# Patient Record
Sex: FEMALE | Race: WHITE | Employment: OTHER | URBAN - METROPOLITAN AREA
[De-identification: names, ages, dates, MRNs, and addresses within clinical notes are randomized per-mention and may not be internally consistent; named-entity substitution may affect disease eponyms.]

---

## 2024-01-30 ENCOUNTER — APPOINTMENT (OUTPATIENT)
Dept: GENERAL RADIOLOGY | Age: 69
End: 2024-01-30
Payer: MEDICARE

## 2024-01-30 ENCOUNTER — HOSPITAL ENCOUNTER (EMERGENCY)
Age: 69
Discharge: HOME | End: 2024-01-30
Payer: MEDICARE

## 2024-01-30 VITALS
WEIGHT: 190 LBS | DIASTOLIC BLOOD PRESSURE: 66 MMHG | TEMPERATURE: 97.5 F | HEIGHT: 64 IN | RESPIRATION RATE: 18 BRPM | BODY MASS INDEX: 32.44 KG/M2 | OXYGEN SATURATION: 98 % | SYSTOLIC BLOOD PRESSURE: 135 MMHG | HEART RATE: 73 BPM

## 2024-01-30 DIAGNOSIS — M54.41 ACUTE RIGHT-SIDED LOW BACK PAIN WITH RIGHT-SIDED SCIATICA: Primary | ICD-10-CM

## 2024-01-30 PROCEDURE — 6370000000 HC RX 637 (ALT 250 FOR IP)

## 2024-01-30 PROCEDURE — 6360000002 HC RX W HCPCS

## 2024-01-30 PROCEDURE — 96372 THER/PROPH/DIAG INJ SC/IM: CPT

## 2024-01-30 PROCEDURE — 99284 EMERGENCY DEPT VISIT MOD MDM: CPT

## 2024-01-30 PROCEDURE — 72110 X-RAY EXAM L-2 SPINE 4/>VWS: CPT

## 2024-01-30 RX ORDER — LIDOCAINE 50 MG/G
1 PATCH TOPICAL DAILY
Qty: 10 PATCH | Refills: 0 | Status: SHIPPED | OUTPATIENT
Start: 2024-01-30

## 2024-01-30 RX ORDER — IBUPROFEN 800 MG/1
800 TABLET ORAL 2 TIMES DAILY PRN
Qty: 180 TABLET | Refills: 0 | Status: SHIPPED | OUTPATIENT
Start: 2024-01-30

## 2024-01-30 RX ORDER — KETOROLAC TROMETHAMINE 15 MG/ML
15 INJECTION, SOLUTION INTRAMUSCULAR; INTRAVENOUS ONCE
Status: DISCONTINUED | OUTPATIENT
Start: 2024-01-30 | End: 2024-01-30

## 2024-01-30 RX ORDER — KETOROLAC TROMETHAMINE 15 MG/ML
15 INJECTION, SOLUTION INTRAMUSCULAR; INTRAVENOUS ONCE
Status: COMPLETED | OUTPATIENT
Start: 2024-01-30 | End: 2024-01-30

## 2024-01-30 RX ORDER — METHYLPREDNISOLONE 4 MG/1
TABLET ORAL
Qty: 1 KIT | Refills: 0 | Status: SHIPPED | OUTPATIENT
Start: 2024-01-30 | End: 2024-02-05

## 2024-01-30 RX ORDER — ORPHENADRINE CITRATE 30 MG/ML
60 INJECTION INTRAMUSCULAR; INTRAVENOUS ONCE
Status: COMPLETED | OUTPATIENT
Start: 2024-01-30 | End: 2024-01-30

## 2024-01-30 RX ORDER — OXYCODONE AND ACETAMINOPHEN 7.5; 325 MG/1; MG/1
1 TABLET ORAL ONCE
Status: COMPLETED | OUTPATIENT
Start: 2024-01-30 | End: 2024-01-30

## 2024-01-30 RX ORDER — CYCLOBENZAPRINE HCL 10 MG
10 TABLET ORAL 3 TIMES DAILY PRN
Qty: 9 TABLET | Refills: 0 | Status: SHIPPED | OUTPATIENT
Start: 2024-01-30 | End: 2024-02-02

## 2024-01-30 RX ADMIN — ORPHENADRINE CITRATE 60 MG: 60 INJECTION INTRAMUSCULAR; INTRAVENOUS at 12:10

## 2024-01-30 RX ADMIN — OXYCODONE AND ACETAMINOPHEN 1 TABLET: 7.5; 325 TABLET ORAL at 13:26

## 2024-01-30 RX ADMIN — KETOROLAC TROMETHAMINE 15 MG: 15 INJECTION, SOLUTION INTRAMUSCULAR; INTRAVENOUS at 12:15

## 2024-01-30 ASSESSMENT — PAIN DESCRIPTION - LOCATION
LOCATION: BACK
LOCATION: BACK

## 2024-01-30 ASSESSMENT — PAIN - FUNCTIONAL ASSESSMENT: PAIN_FUNCTIONAL_ASSESSMENT: 0-10

## 2024-01-30 ASSESSMENT — PAIN SCALES - GENERAL
PAINLEVEL_OUTOF10: 10
PAINLEVEL_OUTOF10: 7

## 2024-01-30 ASSESSMENT — PAIN DESCRIPTION - ORIENTATION: ORIENTATION: LOWER

## 2024-01-30 ASSESSMENT — PAIN DESCRIPTION - PAIN TYPE: TYPE: ACUTE PAIN

## 2024-01-30 ASSESSMENT — ENCOUNTER SYMPTOMS: BACK PAIN: 1

## 2024-01-30 NOTE — DISCHARGE INSTRUCTIONS
Take medications as directed.     Follow-up with PCP.     Return to ED if any new, or worsening symptoms.

## 2024-01-30 NOTE — ED PROVIDER NOTES
[facility] ED  EMERGENCY DEPARTMENT ENCOUNTER      Pt Name: Sil VARGAS  MRN: [id number]  Birthdate 1955  Date of evaluation: 1/30/2024  Provider: ONELIA Kruse  1:24 PM EST    CHIEF COMPLAINT       Chief Complaint   Patient presents with    Back Pain     X2 days   Woke up in pain   No injury          HISTORY OF PRESENT ILLNESS   (Location/Symptom, Timing/Onset, Context/Setting, Quality, Duration, Modifying Factors, Severity)  Note limiting factors.   Sil VARGAS is a 68 y.o. female whom per chart review has a PMHx of hypertension, depression, GERD, anxiety, s/p laminectomy presents to ED for evaluation of back pain.  Patient reports R sided lower back pain that extends down her R leg.  Patient reports symptoms have been present for the last 2 days.  Patient denies injury, trauma.  States that she has been ambulatory, however does report significantly increased discomfort with ambulation.  Patient reports that she did take 7.5 mg of Percocet prior to coming to the emergency department and reports no improvement in symptoms.  Patient verbalizes no additional complaints.  Patient denies chest pain, shortness of breath, N/V/D, fever, chills, hematuria, dysuria, urinary frequency/urgency, saddle anesthesia, incontinence of bowel or bladder, numbness/tingling/weakness to BUE and BLE.    Patient is reporting that she is out of Percocet, however upon review of [facility] patient did have Percocet prescription filled on 01/02/2024 and was given 120 (30-day supply), 7.5 mg Percocets at that time.    HPI    Nursing Notes were reviewed.    REVIEW OF SYSTEMS    (2-9 systems for level 4, 10 or more for level 5)     Review of Systems   Musculoskeletal:  Positive for back pain.   All other systems reviewed and are negative.      Except as noted above the remainder of the review of systems was reviewed and negative.       PAST MEDICAL HISTORY     Past Medical History:   Diagnosis Date    Anxiety

## 2024-01-30 NOTE — ED TRIAGE NOTES
Pt arrives by private vehicle with report of back pain x2 days   Pt states it woke her out of her sleep   Denies any injury or trauma   Pt took percocet around 5am this morning

## 2024-04-13 ENCOUNTER — APPOINTMENT (OUTPATIENT)
Dept: GENERAL RADIOLOGY | Age: 69
End: 2024-04-13
Payer: MEDICARE

## 2024-04-13 ENCOUNTER — HOSPITAL ENCOUNTER (EMERGENCY)
Age: 69
Discharge: HOME | End: 2024-04-13
Attending: EMERGENCY MEDICINE
Payer: MEDICARE

## 2024-04-13 VITALS
TEMPERATURE: 98.6 F | RESPIRATION RATE: 20 BRPM | WEIGHT: 170 LBS | SYSTOLIC BLOOD PRESSURE: 115 MMHG | HEIGHT: 64 IN | HEART RATE: 76 BPM | BODY MASS INDEX: 29.02 KG/M2 | DIASTOLIC BLOOD PRESSURE: 74 MMHG | OXYGEN SATURATION: 97 %

## 2024-04-13 DIAGNOSIS — E86.0 DEHYDRATION: ICD-10-CM

## 2024-04-13 DIAGNOSIS — R07.9 CHEST PAIN, UNSPECIFIED TYPE: Primary | ICD-10-CM

## 2024-04-13 LAB
ALBUMIN SERPL-MCNC: 4.6 G/DL (ref 3.5–4.6)
ALP SERPL-CCNC: 108 U/L (ref 40–130)
ALT SERPL-CCNC: 17 U/L (ref 0–33)
ANION GAP SERPL CALCULATED.3IONS-SCNC: 13 MEQ/L (ref 9–15)
AST SERPL-CCNC: 17 U/L (ref 0–35)
BASOPHILS # BLD: 0 K/UL (ref 0–0.2)
BASOPHILS NFR BLD: 0.5 %
BILIRUB SERPL-MCNC: 0.6 MG/DL (ref 0.2–0.7)
BUN SERPL-MCNC: 12 MG/DL (ref 8–23)
CALCIUM SERPL-MCNC: 9.5 MG/DL (ref 8.5–9.9)
CHLORIDE SERPL-SCNC: 101 MEQ/L (ref 95–107)
CO2 SERPL-SCNC: 24 MEQ/L (ref 20–31)
CREAT SERPL-MCNC: 1.18 MG/DL (ref 0.5–0.9)
D DIMER PPP FEU-MCNC: 0.39 MG/L FEU (ref 0–0.5)
EOSINOPHIL # BLD: 0 K/UL (ref 0–0.7)
EOSINOPHIL NFR BLD: 0.3 %
ERYTHROCYTE [DISTWIDTH] IN BLOOD BY AUTOMATED COUNT: 12.6 % (ref 11.5–14.5)
GLOBULIN SER CALC-MCNC: 3.6 G/DL (ref 2.3–3.5)
GLUCOSE SERPL-MCNC: 129 MG/DL (ref 70–99)
HCT VFR BLD AUTO: 43.6 % (ref 37–47)
HGB BLD-MCNC: 14.8 G/DL (ref 12–16)
LYMPHOCYTES # BLD: 1.4 K/UL (ref 1–4.8)
LYMPHOCYTES NFR BLD: 15.5 %
MCH RBC QN AUTO: 32 PG (ref 27–31.3)
MCHC RBC AUTO-ENTMCNC: 33.9 % (ref 33–37)
MCV RBC AUTO: 94.4 FL (ref 79.4–94.8)
MONOCYTES # BLD: 0.4 K/UL (ref 0.2–0.8)
MONOCYTES NFR BLD: 4.6 %
NEUTROPHILS # BLD: 6.9 K/UL (ref 1.4–6.5)
NEUTS SEG NFR BLD: 78.8 %
PLATELET # BLD AUTO: 293 K/UL (ref 130–400)
POTASSIUM SERPL-SCNC: 3.2 MEQ/L (ref 3.4–4.9)
PROT SERPL-MCNC: 8.2 G/DL (ref 6.3–8)
RBC # BLD AUTO: 4.62 M/UL (ref 4.2–5.4)
SODIUM SERPL-SCNC: 138 MEQ/L (ref 135–144)
TROPONIN, HIGH SENSITIVITY: 14 NG/L (ref 0–19)
TROPONIN, HIGH SENSITIVITY: 15 NG/L (ref 0–19)
WBC # BLD AUTO: 8.8 K/UL (ref 4.8–10.8)

## 2024-04-13 PROCEDURE — 96376 TX/PRO/DX INJ SAME DRUG ADON: CPT

## 2024-04-13 PROCEDURE — 36415 COLL VENOUS BLD VENIPUNCTURE: CPT

## 2024-04-13 PROCEDURE — 80053 COMPREHEN METABOLIC PANEL: CPT

## 2024-04-13 PROCEDURE — 93005 ELECTROCARDIOGRAM TRACING: CPT | Performed by: EMERGENCY MEDICINE

## 2024-04-13 PROCEDURE — 96361 HYDRATE IV INFUSION ADD-ON: CPT

## 2024-04-13 PROCEDURE — 2580000003 HC RX 258: Performed by: EMERGENCY MEDICINE

## 2024-04-13 PROCEDURE — 99285 EMERGENCY DEPT VISIT HI MDM: CPT

## 2024-04-13 PROCEDURE — 85379 FIBRIN DEGRADATION QUANT: CPT

## 2024-04-13 PROCEDURE — 6370000000 HC RX 637 (ALT 250 FOR IP): Performed by: EMERGENCY MEDICINE

## 2024-04-13 PROCEDURE — 84484 ASSAY OF TROPONIN QUANT: CPT

## 2024-04-13 PROCEDURE — 71045 X-RAY EXAM CHEST 1 VIEW: CPT

## 2024-04-13 PROCEDURE — 6360000002 HC RX W HCPCS: Performed by: EMERGENCY MEDICINE

## 2024-04-13 PROCEDURE — 96374 THER/PROPH/DIAG INJ IV PUSH: CPT

## 2024-04-13 PROCEDURE — 85025 COMPLETE CBC W/AUTO DIFF WBC: CPT

## 2024-04-13 RX ORDER — ACETAMINOPHEN 500 MG
1000 TABLET ORAL ONCE
Status: COMPLETED | OUTPATIENT
Start: 2024-04-13 | End: 2024-04-13

## 2024-04-13 RX ORDER — 0.9 % SODIUM CHLORIDE 0.9 %
1000 INTRAVENOUS SOLUTION INTRAVENOUS ONCE
Status: COMPLETED | OUTPATIENT
Start: 2024-04-13 | End: 2024-04-13

## 2024-04-13 RX ORDER — LORAZEPAM 2 MG/ML
1 INJECTION INTRAMUSCULAR ONCE
Status: COMPLETED | OUTPATIENT
Start: 2024-04-13 | End: 2024-04-13

## 2024-04-13 RX ORDER — TRAZODONE HYDROCHLORIDE 50 MG/1
50 TABLET ORAL NIGHTLY
Qty: 30 TABLET | Refills: 1 | Status: SHIPPED | OUTPATIENT
Start: 2024-04-13 | End: 2024-04-13

## 2024-04-13 RX ORDER — TRAZODONE HYDROCHLORIDE 50 MG/1
50 TABLET ORAL NIGHTLY
Qty: 30 TABLET | Refills: 1 | Status: SHIPPED | OUTPATIENT
Start: 2024-04-13

## 2024-04-13 RX ORDER — OXYCODONE HYDROCHLORIDE AND ACETAMINOPHEN 5; 325 MG/1; MG/1
1 TABLET ORAL ONCE
Status: COMPLETED | OUTPATIENT
Start: 2024-04-13 | End: 2024-04-13

## 2024-04-13 RX ORDER — TRAZODONE HYDROCHLORIDE 50 MG/1
50 TABLET ORAL ONCE
Status: COMPLETED | OUTPATIENT
Start: 2024-04-13 | End: 2024-04-13

## 2024-04-13 RX ORDER — ASPIRIN 81 MG/1
162 TABLET, CHEWABLE ORAL ONCE
Status: COMPLETED | OUTPATIENT
Start: 2024-04-13 | End: 2024-04-13

## 2024-04-13 RX ORDER — NITROGLYCERIN 0.4 MG/1
0.4 TABLET SUBLINGUAL EVERY 5 MIN PRN
Status: COMPLETED | OUTPATIENT
Start: 2024-04-13 | End: 2024-04-13

## 2024-04-13 RX ADMIN — TRAZODONE HYDROCHLORIDE 50 MG: 50 TABLET ORAL at 19:04

## 2024-04-13 RX ADMIN — NITROGLYCERIN 0.4 MG: 0.4 TABLET SUBLINGUAL at 17:02

## 2024-04-13 RX ADMIN — ASPIRIN 81 MG 162 MG: 81 TABLET ORAL at 16:38

## 2024-04-13 RX ADMIN — LORAZEPAM 1 MG: 2 INJECTION INTRAMUSCULAR; INTRAVENOUS at 18:38

## 2024-04-13 RX ADMIN — NITROGLYCERIN 0.4 MG: 0.4 TABLET SUBLINGUAL at 17:07

## 2024-04-13 RX ADMIN — LORAZEPAM 1 MG: 2 INJECTION INTRAMUSCULAR; INTRAVENOUS at 16:49

## 2024-04-13 RX ADMIN — ACETAMINOPHEN 1000 MG: 500 TABLET ORAL at 17:58

## 2024-04-13 RX ADMIN — SODIUM CHLORIDE 1000 ML: 9 INJECTION, SOLUTION INTRAVENOUS at 16:47

## 2024-04-13 RX ADMIN — OXYCODONE HYDROCHLORIDE AND ACETAMINOPHEN 1 TABLET: 5; 325 TABLET ORAL at 18:37

## 2024-04-13 RX ADMIN — NITROGLYCERIN 0.4 MG: 0.4 TABLET SUBLINGUAL at 16:57

## 2024-04-13 ASSESSMENT — PAIN SCALES - GENERAL
PAINLEVEL_OUTOF10: 2
PAINLEVEL_OUTOF10: 5
PAINLEVEL_OUTOF10: 5

## 2024-04-13 ASSESSMENT — LIFESTYLE VARIABLES
HOW OFTEN DO YOU HAVE A DRINK CONTAINING ALCOHOL: NEVER
HOW MANY STANDARD DRINKS CONTAINING ALCOHOL DO YOU HAVE ON A TYPICAL DAY: PATIENT DOES NOT DRINK

## 2024-04-13 ASSESSMENT — ENCOUNTER SYMPTOMS
COUGH: 0
CHEST TIGHTNESS: 0
SORE THROAT: 0
EYE DISCHARGE: 0
DIARRHEA: 1
PHOTOPHOBIA: 0
ABDOMINAL PAIN: 0
VOMITING: 0
ABDOMINAL DISTENTION: 0
WHEEZING: 0
NAUSEA: 1
SHORTNESS OF BREATH: 0

## 2024-04-13 ASSESSMENT — PAIN DESCRIPTION - LOCATION
LOCATION: HEAD
LOCATION: HEAD

## 2024-04-13 ASSESSMENT — PAIN - FUNCTIONAL ASSESSMENT: PAIN_FUNCTIONAL_ASSESSMENT: NONE - DENIES PAIN

## 2024-04-13 NOTE — ED PROVIDER NOTES
[facility] ED  eMERGENCY dEPARTMENT eNCOUnter      Pt Name: Sil VARGAS  MRN: [id number]  Birthdate 1955  Date of evaluation: 4/13/2024  Provider: Fortino Ellsworth MD    CHIEF COMPLAINT       Chief Complaint   Patient presents with   • Chest Pain     Pt arrived via squad from home, c/o chest pain and sob, states recently began antibiotic for gum infection.          HISTORY OF PRESENT ILLNESS   (Location/Symptom, Timing/Onset,Context/Setting, Quality, Duration, Modifying Factors, Severity)  Note limiting factors.   Sil VARGAS is a 69 y.o. female who presents to the emergency department for evaluation of chest pain.  Patient has history of hypertension and anxiety and asthma.  She reports occasion to [place] last week and while she was there picked up some type of viral bug causing severe diarrhea and nausea and she really did not eat in the past week.  She flew home and this morning developed chest pressure with no real shortness of breath.  She feels a little bit sweaty at times.  No radiation of the pressure.  No associated fever or chills.  She has some persistent nausea noted along with diarrhea again this morning.  She feels like she may be dehydrated and also states that she feels anxious.  No prior cardiac history besides hypertension.  She had a stress test in the past couple years that apparently was negative.  Non-smoker and no strong family history.    HPI    NursingNotes were reviewed.    REVIEW OF SYSTEMS    (2-9 systems for level 4, 10 or more for level 5)     Review of Systems   Constitutional:  Negative for chills and diaphoresis.   HENT:  Negative for congestion, ear pain, mouth sores and sore throat.    Eyes:  Negative for photophobia and discharge.   Respiratory:  Negative for cough, chest tightness, shortness of breath and wheezing.    Cardiovascular:  Positive for chest pain. Negative for palpitations.   Gastrointestinal:  Positive for diarrhea and nausea. Negative    TempSrc:       SpO2: 98% 98% 98% 97%   Weight:       Height:              MDM    Patient feeling much better on recheck.  She has had problems with anxiety and insomnia I believe this is driving her overall symptoms.  Today she was dehydrated from the diarrhea episodes for the past week.  Her  is now at bedside and states that she was extremely stressed because the airport was stressful, having diarrhea on the plane and which she went through this past week is really stressed her out and he thinks that is why she has the chest pain symptoms.  Her EKG is normal.  Her serial enzymes are negative.  She had no change in symptoms from the nitroglycerin but rather the Ativan seemed to help for the most.  She has had a stress test in the past years it has been negative.  At this point with her heart score of 3, I think she can be discharged home improved to follow-up with her PCP.  Return here for worsening    CONSULTS:  None    PROCEDURES:  Unless otherwise noted below, none     Procedures    FINAL IMPRESSION      1. Chest pain, unspecified type    2. Dehydration          DISPOSITION/PLAN   DISPOSITION Decision To Discharge 04/13/2024 06:52:44 PM      PATIENT REFERRED TO:  Brien Dumas MD  [id number] [address]  [phone]    In 3 days        DISCHARGE MEDICATIONS:  New Prescriptions    No medications on file          (Please note that portions of this note were completed with a voice recognition program.  Efforts were made to edit the dictations but occasionally words are mis-transcribed.)    Fortino Ellsworth MD (electronically signed)  Attending Emergency Physician         Fortino Ellsworth MD  04/13/24 [id number]       Fortino Ellsworth MD  04/13/24 [id number]

## 2024-04-13 NOTE — ED NOTES
Pt c/o restless leg syndrome, states takes percocet at home for it, also c/o increase anxiety. Dr. Ellsworth made aware.

## 2024-04-15 LAB
EKG ATRIAL RATE: 82 BPM
EKG P AXIS: 44 DEGREES
EKG P-R INTERVAL: 138 MS
EKG Q-T INTERVAL: 402 MS
EKG QRS DURATION: 86 MS
EKG QTC CALCULATION (BAZETT): 469 MS
EKG R AXIS: 5 DEGREES
EKG T AXIS: 23 DEGREES
EKG VENTRICULAR RATE: 82 BPM

## 2024-04-15 PROCEDURE — 93010 ELECTROCARDIOGRAM REPORT: CPT | Performed by: INTERNAL MEDICINE

## 2024-04-26 ENCOUNTER — HOSPITAL ENCOUNTER (EMERGENCY)
Age: 69
Discharge: HOME | End: 2024-04-26
Payer: MEDICARE

## 2024-04-26 VITALS
HEART RATE: 88 BPM | TEMPERATURE: 98.6 F | SYSTOLIC BLOOD PRESSURE: 154 MMHG | BODY MASS INDEX: 30.73 KG/M2 | DIASTOLIC BLOOD PRESSURE: 89 MMHG | HEIGHT: 64 IN | WEIGHT: 180 LBS | OXYGEN SATURATION: 96 % | RESPIRATION RATE: 15 BRPM

## 2024-04-26 DIAGNOSIS — F41.1 ANXIETY STATE: Primary | ICD-10-CM

## 2024-04-26 LAB
ALBUMIN SERPL-MCNC: 3.7 G/DL (ref 3.5–4.6)
ALP SERPL-CCNC: 88 U/L (ref 40–130)
ALT SERPL-CCNC: 8 U/L (ref 0–33)
AMPHET UR QL SCN: ABNORMAL
ANION GAP SERPL CALCULATED.3IONS-SCNC: 10 MEQ/L (ref 9–15)
APAP SERPL-MCNC: <5 UG/ML (ref 10–30)
AST SERPL-CCNC: 14 U/L (ref 0–35)
BACTERIA URNS QL MICRO: NEGATIVE /HPF
BARBITURATES UR QL SCN: ABNORMAL
BASOPHILS # BLD: 0 K/UL (ref 0–0.2)
BASOPHILS NFR BLD: 0.4 %
BENZODIAZ UR QL SCN: POSITIVE
BILIRUB SERPL-MCNC: 0.3 MG/DL (ref 0.2–0.7)
BILIRUB UR QL STRIP: NEGATIVE
BUN SERPL-MCNC: 14 MG/DL (ref 8–23)
CALCIUM SERPL-MCNC: 8.8 MG/DL (ref 8.5–9.9)
CANNABINOIDS UR QL SCN: ABNORMAL
CHLORIDE SERPL-SCNC: 105 MEQ/L (ref 95–107)
CHOLEST SERPL-MCNC: 162 MG/DL (ref 0–199)
CK SERPL-CCNC: 57 U/L (ref 0–170)
CLARITY UR: CLEAR
CO2 SERPL-SCNC: 27 MEQ/L (ref 20–31)
COCAINE UR QL SCN: ABNORMAL
COLOR UR: YELLOW
CREAT SERPL-MCNC: 1.24 MG/DL (ref 0.5–0.9)
DRUG SCREEN COMMENT UR-IMP: ABNORMAL
EOSINOPHIL # BLD: 0.1 K/UL (ref 0–0.7)
EOSINOPHIL NFR BLD: 1.9 %
EPI CELLS #/AREA URNS AUTO: ABNORMAL /HPF (ref 0–5)
ERYTHROCYTE [DISTWIDTH] IN BLOOD BY AUTOMATED COUNT: 12.4 % (ref 11.5–14.5)
ESTIMATED AVERAGE GLUCOSE: 103 MG/DL
ETHANOL PERCENT: NORMAL G/DL
ETHANOLAMINE SERPL-MCNC: <10 MG/DL (ref 0–0.08)
FENTANYL SCREEN, URINE: ABNORMAL
GLOBULIN SER CALC-MCNC: 2.8 G/DL (ref 2.3–3.5)
GLUCOSE SERPL-MCNC: 123 MG/DL (ref 70–99)
GLUCOSE UR STRIP-MCNC: NEGATIVE MG/DL
HBA1C MFR BLD: 5.2 % (ref 4–6)
HCT VFR BLD AUTO: 35.1 % (ref 37–47)
HDLC SERPL-MCNC: 45 MG/DL (ref 40–59)
HGB BLD-MCNC: 11.9 G/DL (ref 12–16)
HGB UR QL STRIP: ABNORMAL
HYALINE CASTS #/AREA URNS AUTO: ABNORMAL /HPF (ref 0–5)
KETONES UR STRIP-MCNC: NEGATIVE MG/DL
LDLC SERPL CALC-MCNC: 93 MG/DL (ref 0–129)
LEUKOCYTE ESTERASE UR QL STRIP: ABNORMAL
LYMPHOCYTES # BLD: 1.5 K/UL (ref 1–4.8)
LYMPHOCYTES NFR BLD: 26.8 %
MCH RBC QN AUTO: 31.6 PG (ref 27–31.3)
MCHC RBC AUTO-ENTMCNC: 33.9 % (ref 33–37)
MCV RBC AUTO: 93.1 FL (ref 79.4–94.8)
METHADONE UR QL SCN: ABNORMAL
MONOCYTES # BLD: 0.4 K/UL (ref 0.2–0.8)
MONOCYTES NFR BLD: 6.3 %
NEUTROPHILS # BLD: 3.7 K/UL (ref 1.4–6.5)
NEUTS SEG NFR BLD: 64.2 %
NITRITE UR QL STRIP: NEGATIVE
OPIATES UR QL SCN: ABNORMAL
OXYCODONE UR QL SCN: POSITIVE
PCP UR QL SCN: ABNORMAL
PH UR STRIP: 5.5 [PH] (ref 5–9)
PLATELET # BLD AUTO: 185 K/UL (ref 130–400)
POTASSIUM SERPL-SCNC: 3.8 MEQ/L (ref 3.4–4.9)
PROPOXYPH UR QL SCN: ABNORMAL
PROT SERPL-MCNC: 6.5 G/DL (ref 6.3–8)
PROT UR STRIP-MCNC: NEGATIVE MG/DL
RBC # BLD AUTO: 3.77 M/UL (ref 4.2–5.4)
RBC #/AREA URNS AUTO: ABNORMAL /HPF (ref 0–5)
SALICYLATES SERPL-MCNC: <0.3 MG/DL (ref 15–30)
SARS-COV-2 RDRP RESP QL NAA+PROBE: NOT DETECTED
SODIUM SERPL-SCNC: 142 MEQ/L (ref 135–144)
SP GR UR STRIP: 1.01 (ref 1–1.03)
TRIGL SERPL-MCNC: 121 MG/DL (ref 0–150)
TSH SERPL-MCNC: 2.1 UIU/ML (ref 0.44–3.86)
URINE REFLEX TO CULTURE: ABNORMAL
UROBILINOGEN UR STRIP-ACNC: 0.2 E.U./DL
WBC # BLD AUTO: 5.7 K/UL (ref 4.8–10.8)
WBC #/AREA URNS AUTO: ABNORMAL /HPF (ref 0–5)

## 2024-04-26 PROCEDURE — 99283 EMERGENCY DEPT VISIT LOW MDM: CPT

## 2024-04-26 PROCEDURE — 80053 COMPREHEN METABOLIC PANEL: CPT

## 2024-04-26 PROCEDURE — 80307 DRUG TEST PRSMV CHEM ANLYZR: CPT

## 2024-04-26 PROCEDURE — 80179 DRUG ASSAY SALICYLATE: CPT

## 2024-04-26 PROCEDURE — 83036 HEMOGLOBIN GLYCOSYLATED A1C: CPT

## 2024-04-26 PROCEDURE — 87635 SARS-COV-2 COVID-19 AMP PRB: CPT

## 2024-04-26 PROCEDURE — 84443 ASSAY THYROID STIM HORMONE: CPT

## 2024-04-26 PROCEDURE — 85025 COMPLETE CBC W/AUTO DIFF WBC: CPT

## 2024-04-26 PROCEDURE — 80143 DRUG ASSAY ACETAMINOPHEN: CPT

## 2024-04-26 PROCEDURE — 6370000000 HC RX 637 (ALT 250 FOR IP): Performed by: EMERGENCY MEDICINE

## 2024-04-26 PROCEDURE — 80061 LIPID PANEL: CPT

## 2024-04-26 PROCEDURE — 82550 ASSAY OF CK (CPK): CPT

## 2024-04-26 PROCEDURE — 36415 COLL VENOUS BLD VENIPUNCTURE: CPT

## 2024-04-26 PROCEDURE — 82077 ASSAY SPEC XCP UR&BREATH IA: CPT

## 2024-04-26 PROCEDURE — 81001 URINALYSIS AUTO W/SCOPE: CPT

## 2024-04-26 RX ORDER — ALPRAZOLAM 0.5 MG/1
0.5 TABLET ORAL ONCE
Status: COMPLETED | OUTPATIENT
Start: 2024-04-26 | End: 2024-04-26

## 2024-04-26 RX ADMIN — ALPRAZOLAM 0.5 MG: 0.5 TABLET ORAL at 02:11

## 2024-04-26 ASSESSMENT — LIFESTYLE VARIABLES
HOW MANY STANDARD DRINKS CONTAINING ALCOHOL DO YOU HAVE ON A TYPICAL DAY: PATIENT DOES NOT DRINK
HOW OFTEN DO YOU HAVE A DRINK CONTAINING ALCOHOL: NEVER

## 2024-04-26 ASSESSMENT — ENCOUNTER SYMPTOMS
ABDOMINAL PAIN: 0
APNEA: 0
ALLERGIC/IMMUNOLOGIC NEGATIVE: 1
COLOR CHANGE: 0
EYE PAIN: 0
SHORTNESS OF BREATH: 0
TROUBLE SWALLOWING: 0

## 2024-04-26 ASSESSMENT — PAIN - FUNCTIONAL ASSESSMENT: PAIN_FUNCTIONAL_ASSESSMENT: NONE - DENIES PAIN

## 2024-04-26 NOTE — ED PROVIDER NOTES
[facility] ED  eMERGENCYdEPARTMENT eNCOUnter        Pt Name: Sil VARGAS  MRN: [id number]  Birthdate 1955of evaluation: 4/26/2024  Provider:Fortino Ellsworth MD  12:50 AM EDT    CHIEF COMPLAINT       Chief Complaint   Patient presents with    Psychiatric Evaluation         HISTORY OF PRESENT ILLNESS  (Location/Symptom, Timing/Onset, Context/Setting, Quality, Duration, Modifying Factors, Severity.)   Sil VARGAS is a 69 y.o. female who presents to the emergency department with complaints of depression, anxiety, sleep disturbances and \"OCD\".  Patient states \"not really\" when I asked if she had any suicidal ideation.  She denies any homicidal ideation.    HPI    Nursing Notes were reviewed and I agree.    REVIEW OF SYSTEMS    (2-9 systems for level 4, 10 or more for level 5)     Review of Systems   Constitutional:  Negative for diaphoresis and fever.   HENT:  Negative for hearing loss and trouble swallowing.    Eyes:  Negative for pain.   Respiratory:  Negative for apnea and shortness of breath.    Cardiovascular:  Negative for chest pain.   Gastrointestinal:  Negative for abdominal pain.   Endocrine: Negative.    Genitourinary:  Negative for hematuria.   Musculoskeletal:  Negative for neck pain and neck stiffness.   Skin:  Negative for color change.   Allergic/Immunologic: Negative.    Neurological:  Negative for dizziness and numbness.   Hematological: Negative.    Psychiatric/Behavioral:  Positive for sleep disturbance. The patient is nervous/anxious.    All other systems reviewed and are negative.       as noted above the remainder of the review of systems was reviewed and negative.       PAST MEDICAL HISTORY     Past Medical History:   Diagnosis Date    Anxiety     Carcinoid (except of appendix)     Depression     GERD (gastroesophageal reflux disease)     Hypertension          SURGICAL HISTORY       Past Surgical History:   Procedure Laterality Date    BREAST SURGERY

## 2024-04-26 NOTE — ED NOTES
Pt c/o cold symptoms and is requesting covid testing due to recent travel.  Swab obtained and sent to lab.

## 2024-04-26 NOTE — ED TRIAGE NOTES
Since the beginning of the month having problems dealing with her depression, OCD and anxiety. Hasn't been able to sleep in 2 night d/t racing thoughts denies SI/HI.

## 2024-04-26 NOTE — ED NOTES
Home instructions reviewed with patient and . Patient to follow up with PCP later today.  Patient and  verbalize an understanding for discharge and follow up instructions.  Patient ambulated to exit without difficulty

## 2024-04-26 NOTE — ED NOTES
Patient up to nurses station asking how much longer as she is feeling calmer and would like to go home.  Patient will call her PCP in the AM

## 2024-04-26 NOTE — ED NOTES
Patient changed into psych safe clothing.   Urine  obtained and sent to lab.   Skin and contraband check performed.   Contraband check negative at this time.   Lab called to draw blood.

## 2024-04-26 NOTE — ED NOTES
Spoke with patient and . They both state that she just needs something for her anxiety and they would like to be discharged home.  The patient states that she followed up with her PCP after her last ED visit on 4/13/2024, the PCP started her on Xanax 0.25 mg. The patient states that this has not helped as much as the ativan that she receive while in the ED.  She reports that she is still having difficulty sleeping with the 50 mg of trazodone, she is going to contact her PCP for an increased dose. The  remains at the bedside. The patient denies HI,SI, and A/V hallucinations.

## 2024-05-07 ENCOUNTER — HOSPITAL ENCOUNTER (INPATIENT)
Age: 69
LOS: 3 days | Discharge: HOME | End: 2024-05-10
Attending: EMERGENCY MEDICINE | Admitting: PSYCHIATRY & NEUROLOGY
Payer: MEDICARE

## 2024-05-07 DIAGNOSIS — G47.00 INSOMNIA, UNSPECIFIED TYPE: ICD-10-CM

## 2024-05-07 DIAGNOSIS — F41.1 ANXIETY STATE: Primary | ICD-10-CM

## 2024-05-07 DIAGNOSIS — F32.A DEPRESSION, UNSPECIFIED DEPRESSION TYPE: ICD-10-CM

## 2024-05-07 LAB
ALBUMIN SERPL-MCNC: 4 G/DL (ref 3.5–4.6)
ALP SERPL-CCNC: 98 U/L (ref 40–130)
ALT SERPL-CCNC: 10 U/L (ref 0–33)
AMPHET UR QL SCN: POSITIVE
ANION GAP SERPL CALCULATED.3IONS-SCNC: 11 MEQ/L (ref 9–15)
APAP SERPL-MCNC: <5 UG/ML (ref 10–30)
AST SERPL-CCNC: 19 U/L (ref 0–35)
BACTERIA URNS QL MICRO: NEGATIVE /HPF
BARBITURATES UR QL SCN: ABNORMAL
BASOPHILS # BLD: 0 K/UL (ref 0–0.2)
BASOPHILS NFR BLD: 0.2 %
BENZODIAZ UR QL SCN: POSITIVE
BILIRUB SERPL-MCNC: 0.4 MG/DL (ref 0.2–0.7)
BILIRUB UR QL STRIP: NEGATIVE
BUN SERPL-MCNC: 9 MG/DL (ref 8–23)
CALCIUM SERPL-MCNC: 8.6 MG/DL (ref 8.5–9.9)
CANNABINOIDS UR QL SCN: ABNORMAL
CHLORIDE SERPL-SCNC: 103 MEQ/L (ref 95–107)
CHOLEST SERPL-MCNC: 146 MG/DL (ref 0–199)
CK SERPL-CCNC: 84 U/L (ref 0–170)
CLARITY UR: CLEAR
CO2 SERPL-SCNC: 26 MEQ/L (ref 20–31)
COCAINE UR QL SCN: ABNORMAL
COLOR UR: YELLOW
CREAT SERPL-MCNC: 1.41 MG/DL (ref 0.5–0.9)
DRUG SCREEN COMMENT UR-IMP: ABNORMAL
EOSINOPHIL # BLD: 0 K/UL (ref 0–0.7)
EOSINOPHIL NFR BLD: 0.9 %
EPI CELLS #/AREA URNS AUTO: ABNORMAL /HPF (ref 0–5)
ERYTHROCYTE [DISTWIDTH] IN BLOOD BY AUTOMATED COUNT: 13.2 % (ref 11.5–14.5)
ESTIMATED AVERAGE GLUCOSE: 103 MG/DL
ETHANOL PERCENT: NORMAL G/DL
ETHANOLAMINE SERPL-MCNC: <10 MG/DL (ref 0–0.08)
FENTANYL SCREEN, URINE: ABNORMAL
GLOBULIN SER CALC-MCNC: 3.1 G/DL (ref 2.3–3.5)
GLUCOSE SERPL-MCNC: 138 MG/DL (ref 70–99)
GLUCOSE UR STRIP-MCNC: NEGATIVE MG/DL
HBA1C MFR BLD: 5.2 % (ref 4–6)
HCT VFR BLD AUTO: 35.3 % (ref 37–47)
HDLC SERPL-MCNC: 44 MG/DL (ref 40–59)
HGB BLD-MCNC: 11.8 G/DL (ref 12–16)
HGB UR QL STRIP: ABNORMAL
HYALINE CASTS #/AREA URNS AUTO: ABNORMAL /HPF (ref 0–5)
KETONES UR STRIP-MCNC: ABNORMAL MG/DL
LDLC SERPL CALC-MCNC: 74 MG/DL (ref 0–129)
LEUKOCYTE ESTERASE UR QL STRIP: NEGATIVE
LYMPHOCYTES # BLD: 0.6 K/UL (ref 1–4.8)
LYMPHOCYTES NFR BLD: 12.4 %
MCH RBC QN AUTO: 32 PG (ref 27–31.3)
MCHC RBC AUTO-ENTMCNC: 33.4 % (ref 33–37)
MCV RBC AUTO: 95.7 FL (ref 79.4–94.8)
METHADONE UR QL SCN: ABNORMAL
MONOCYTES # BLD: 0.3 K/UL (ref 0.2–0.8)
MONOCYTES NFR BLD: 5.9 %
NEUTROPHILS # BLD: 3.6 K/UL (ref 1.4–6.5)
NEUTS SEG NFR BLD: 80.4 %
NITRITE UR QL STRIP: NEGATIVE
OPIATES UR QL SCN: ABNORMAL
OXYCODONE UR QL SCN: POSITIVE
PCP UR QL SCN: ABNORMAL
PH UR STRIP: 6 [PH] (ref 5–9)
PLATELET # BLD AUTO: 225 K/UL (ref 130–400)
POTASSIUM SERPL-SCNC: 3.9 MEQ/L (ref 3.4–4.9)
PROPOXYPH UR QL SCN: ABNORMAL
PROT SERPL-MCNC: 7.1 G/DL (ref 6.3–8)
PROT UR STRIP-MCNC: 30 MG/DL
RBC # BLD AUTO: 3.69 M/UL (ref 4.2–5.4)
RBC #/AREA URNS AUTO: ABNORMAL /HPF (ref 0–5)
SALICYLATES SERPL-MCNC: <0.3 MG/DL (ref 15–30)
SODIUM SERPL-SCNC: 140 MEQ/L (ref 135–144)
SP GR UR STRIP: 1.02 (ref 1–1.03)
TRIGL SERPL-MCNC: 139 MG/DL (ref 0–150)
TSH SERPL-MCNC: 1.05 UIU/ML (ref 0.44–3.86)
URINE REFLEX TO CULTURE: ABNORMAL
UROBILINOGEN UR STRIP-ACNC: 0.2 E.U./DL
WBC # BLD AUTO: 4.4 K/UL (ref 4.8–10.8)
WBC #/AREA URNS AUTO: ABNORMAL /HPF (ref 0–5)

## 2024-05-07 PROCEDURE — 85025 COMPLETE CBC W/AUTO DIFF WBC: CPT

## 2024-05-07 PROCEDURE — 80179 DRUG ASSAY SALICYLATE: CPT

## 2024-05-07 PROCEDURE — 84443 ASSAY THYROID STIM HORMONE: CPT

## 2024-05-07 PROCEDURE — 81001 URINALYSIS AUTO W/SCOPE: CPT

## 2024-05-07 PROCEDURE — 80061 LIPID PANEL: CPT

## 2024-05-07 PROCEDURE — 99285 EMERGENCY DEPT VISIT HI MDM: CPT

## 2024-05-07 PROCEDURE — 1240000000 HC EMOTIONAL WELLNESS R&B

## 2024-05-07 PROCEDURE — 36415 COLL VENOUS BLD VENIPUNCTURE: CPT

## 2024-05-07 PROCEDURE — 83036 HEMOGLOBIN GLYCOSYLATED A1C: CPT

## 2024-05-07 PROCEDURE — 82077 ASSAY SPEC XCP UR&BREATH IA: CPT

## 2024-05-07 PROCEDURE — 80143 DRUG ASSAY ACETAMINOPHEN: CPT

## 2024-05-07 PROCEDURE — 82550 ASSAY OF CK (CPK): CPT

## 2024-05-07 PROCEDURE — 80053 COMPREHEN METABOLIC PANEL: CPT

## 2024-05-07 PROCEDURE — 80307 DRUG TEST PRSMV CHEM ANLYZR: CPT

## 2024-05-07 PROCEDURE — 6370000000 HC RX 637 (ALT 250 FOR IP): Performed by: PSYCHIATRY & NEUROLOGY

## 2024-05-07 RX ORDER — HALOPERIDOL 5 MG/ML
5 INJECTION INTRAMUSCULAR EVERY 6 HOURS PRN
Status: DISCONTINUED | OUTPATIENT
Start: 2024-05-07 | End: 2024-05-10 | Stop reason: HOSPADM

## 2024-05-07 RX ORDER — BUPROPION HYDROCHLORIDE 150 MG/1
150 TABLET ORAL EVERY MORNING
Status: ON HOLD | COMMUNITY
End: 2024-05-10 | Stop reason: HOSPADM

## 2024-05-07 RX ORDER — HALOPERIDOL 5 MG/1
5 TABLET ORAL EVERY 6 HOURS PRN
Status: DISCONTINUED | OUTPATIENT
Start: 2024-05-07 | End: 2024-05-10 | Stop reason: HOSPADM

## 2024-05-07 RX ORDER — OXYCODONE AND ACETAMINOPHEN 7.5; 325 MG/1; MG/1
1 TABLET ORAL EVERY 6 HOURS PRN
Status: DISCONTINUED | OUTPATIENT
Start: 2024-05-07 | End: 2024-05-10 | Stop reason: HOSPADM

## 2024-05-07 RX ORDER — ALPRAZOLAM 0.25 MG/1
0.25 TABLET ORAL 2 TIMES DAILY PRN
Status: DISCONTINUED | OUTPATIENT
Start: 2024-05-07 | End: 2024-05-08

## 2024-05-07 RX ORDER — ALPRAZOLAM 0.25 MG/1
0.25 TABLET ORAL 2 TIMES DAILY PRN
Status: ON HOLD | COMMUNITY
End: 2024-05-10 | Stop reason: HOSPADM

## 2024-05-07 RX ORDER — ACETAMINOPHEN 325 MG/1
650 TABLET ORAL EVERY 4 HOURS PRN
Status: DISCONTINUED | OUTPATIENT
Start: 2024-05-07 | End: 2024-05-10 | Stop reason: HOSPADM

## 2024-05-07 RX ORDER — BUPROPION HYDROCHLORIDE 150 MG/1
150 TABLET ORAL EVERY MORNING
Status: DISCONTINUED | OUTPATIENT
Start: 2024-05-08 | End: 2024-05-08

## 2024-05-07 RX ORDER — MAGNESIUM HYDROXIDE/ALUMINUM HYDROXICE/SIMETHICONE 120; 1200; 1200 MG/30ML; MG/30ML; MG/30ML
30 SUSPENSION ORAL PRN
Status: DISCONTINUED | OUTPATIENT
Start: 2024-05-07 | End: 2024-05-10 | Stop reason: HOSPADM

## 2024-05-07 RX ORDER — TRAZODONE HYDROCHLORIDE 100 MG/1
100 TABLET ORAL NIGHTLY
Status: DISCONTINUED | OUTPATIENT
Start: 2024-05-07 | End: 2024-05-08

## 2024-05-07 RX ORDER — HYDROXYZINE PAMOATE 50 MG/1
50 CAPSULE ORAL EVERY 6 HOURS PRN
Status: DISCONTINUED | OUTPATIENT
Start: 2024-05-07 | End: 2024-05-10 | Stop reason: HOSPADM

## 2024-05-07 RX ORDER — FLUOXETINE HYDROCHLORIDE 20 MG/1
40 CAPSULE ORAL DAILY
Status: DISCONTINUED | OUTPATIENT
Start: 2024-05-08 | End: 2024-05-10 | Stop reason: HOSPADM

## 2024-05-07 RX ORDER — BENZTROPINE MESYLATE 1 MG/ML
2 INJECTION INTRAMUSCULAR; INTRAVENOUS 2 TIMES DAILY PRN
Status: DISCONTINUED | OUTPATIENT
Start: 2024-05-07 | End: 2024-05-10 | Stop reason: HOSPADM

## 2024-05-07 RX ORDER — HYDROXYZINE HYDROCHLORIDE 50 MG/ML
25 INJECTION, SOLUTION INTRAMUSCULAR EVERY 6 HOURS PRN
Status: DISCONTINUED | OUTPATIENT
Start: 2024-05-07 | End: 2024-05-10 | Stop reason: HOSPADM

## 2024-05-07 RX ADMIN — TRAZODONE HYDROCHLORIDE 100 MG: 100 TABLET ORAL at 20:52

## 2024-05-07 RX ADMIN — OXYCODONE AND ACETAMINOPHEN 1 TABLET: 7.5; 325 TABLET ORAL at 22:50

## 2024-05-07 RX ADMIN — OXYCODONE AND ACETAMINOPHEN 1 TABLET: 7.5; 325 TABLET ORAL at 16:08

## 2024-05-07 RX ADMIN — ALPRAZOLAM 0.25 MG: 0.25 TABLET ORAL at 20:33

## 2024-05-07 RX ADMIN — ALPRAZOLAM 0.25 MG: 0.25 TABLET ORAL at 14:31

## 2024-05-07 ASSESSMENT — PATIENT HEALTH QUESTIONNAIRE - PHQ9
10. IF YOU CHECKED OFF ANY PROBLEMS, HOW DIFFICULT HAVE THESE PROBLEMS MADE IT FOR YOU TO DO YOUR WORK, TAKE CARE OF THINGS AT HOME, OR GET ALONG WITH OTHER PEOPLE: EXTREMELY DIFFICULT
6. FEELING BAD ABOUT YOURSELF - OR THAT YOU ARE A FAILURE OR HAVE LET YOURSELF OR YOUR FAMILY DOWN: NEARLY EVERY DAY
4. FEELING TIRED OR HAVING LITTLE ENERGY: NEARLY EVERY DAY
1. LITTLE INTEREST OR PLEASURE IN DOING THINGS: NEARLY EVERY DAY
SUM OF ALL RESPONSES TO PHQ QUESTIONS 1-9: 20
8. MOVING OR SPEAKING SO SLOWLY THAT OTHER PEOPLE COULD HAVE NOTICED. OR THE OPPOSITE, BEING SO FIGETY OR RESTLESS THAT YOU HAVE BEEN MOVING AROUND A LOT MORE THAN USUAL: NOT AT ALL
3. TROUBLE FALLING OR STAYING ASLEEP: NEARLY EVERY DAY
SUM OF ALL RESPONSES TO PHQ QUESTIONS 1-9: 20
2. FEELING DOWN, DEPRESSED OR HOPELESS: NEARLY EVERY DAY
SUM OF ALL RESPONSES TO PHQ QUESTIONS 1-9: 20
9. THOUGHTS THAT YOU WOULD BE BETTER OFF DEAD, OR OF HURTING YOURSELF: SEVERAL DAYS
SUM OF ALL RESPONSES TO PHQ9 QUESTIONS 1 & 2: 6
5. POOR APPETITE OR OVEREATING: SEVERAL DAYS
SUM OF ALL RESPONSES TO PHQ QUESTIONS 1-9: 19
7. TROUBLE CONCENTRATING ON THINGS, SUCH AS READING THE NEWSPAPER OR WATCHING TELEVISION: NEARLY EVERY DAY

## 2024-05-07 ASSESSMENT — PAIN - FUNCTIONAL ASSESSMENT: PAIN_FUNCTIONAL_ASSESSMENT: 0-10

## 2024-05-07 ASSESSMENT — PAIN DESCRIPTION - ORIENTATION: ORIENTATION: LEFT;RIGHT

## 2024-05-07 ASSESSMENT — PAIN DESCRIPTION - LOCATION
LOCATION: MOUTH
LOCATION: MOUTH

## 2024-05-07 ASSESSMENT — PAIN SCALES - GENERAL
PAINLEVEL_OUTOF10: 5
PAINLEVEL_OUTOF10: 8
PAINLEVEL_OUTOF10: 8

## 2024-05-07 ASSESSMENT — LIFESTYLE VARIABLES
HOW OFTEN DO YOU HAVE A DRINK CONTAINING ALCOHOL: NEVER
HOW OFTEN DO YOU HAVE A DRINK CONTAINING ALCOHOL: NEVER
HOW MANY STANDARD DRINKS CONTAINING ALCOHOL DO YOU HAVE ON A TYPICAL DAY: PATIENT DOES NOT DRINK
HOW MANY STANDARD DRINKS CONTAINING ALCOHOL DO YOU HAVE ON A TYPICAL DAY: PATIENT DOES NOT DRINK

## 2024-05-07 ASSESSMENT — PAIN DESCRIPTION - PAIN TYPE: TYPE: ACUTE PAIN

## 2024-05-07 ASSESSMENT — PAIN DESCRIPTION - DESCRIPTORS: DESCRIPTORS: ACHING

## 2024-05-07 ASSESSMENT — ENCOUNTER SYMPTOMS: ABDOMINAL PAIN: 0

## 2024-05-07 ASSESSMENT — SLEEP AND FATIGUE QUESTIONNAIRES
DO YOU USE A SLEEP AID: YES
DO YOU HAVE DIFFICULTY SLEEPING: YES
SLEEP PATTERN: DISTURBED/INTERRUPTED SLEEP;EARLY AWAKENING
AVERAGE NUMBER OF SLEEP HOURS: 2

## 2024-05-07 NOTE — PROGRESS NOTES
Patient arrived to the unit via wheelchair. She walks with a strong and steady gait. Skin and contraband check negative.

## 2024-05-07 NOTE — ED TRIAGE NOTES
Patient arrived via private car due to having severe anxiety and thoughts of suicide with out a plan. Her  Advised that she is to come to the ED to be admitted.  states that all guns/weapons are locked up and all her medication has been taken away and he dispenses it.

## 2024-05-07 NOTE — PLAN OF CARE
Admission completed, consents signed. Shreyas fitch  last psych admission was 38 years ago. She says she is a retired  from [employer]. She doesn't understand why she is having issues again. She Reports feeling very depressed and anxious with intrusive suicidal thoughts that she \"would never act on\". Patient reports that she brought all of her pills to her  and told him to take them away because she \"couldn't take it anymore\". She reports good appetite and 2 hours of sleep at night. She is histrionic, loose association, difficulty getting thoughts out, tearful. She has temporary dental implants, and    Pain 8/10 associated with pulled teeth.     Problem: Self Harm/Suicidality  Goal: Will have no self-injury during hospital stay  Description: INTERVENTIONS:  1.  Ensure constant observer at bedside with Q15M safety checks  2.  Maintain a safe environment  3.  Secure patient belongings  4.  Ensure family/visitors adhere to safety recommendations  5.  Ensure safety tray has been added to patient's diet order  6.  Every shift and PRN: Re-assess suicidal risk via Frequent Screener    5/7/2024 1507 by Petrona Linares RN  Outcome: Progressing  5/7/2024 1507 by Petrona Linares RN  Outcome: Progressing     Problem: Pain  Goal: Verbalizes/displays adequate comfort level or baseline comfort level  5/7/2024 1507 by Petrona Linares RN  Outcome: Progressing  5/7/2024 1507 by Petrona Linares RN  Outcome: Progressing     Problem: Risk for Elopement  Goal: Patient will not exit the unit/facility without proper excort  5/7/2024 1507 by Petrona Linares RN  Outcome: Progressing  5/7/2024 1507 by Petrona Linares RN  Outcome: Progressing  Flowsheets (Taken 5/7/2024 1448)  Nursing Interventions for Elopement Risk:   Reduce environmental triggers   Assist with personal care needs such as toileting, eating, dressing, as needed to reduce the risk of wandering   Make sure patient has all necessary personal care items      Problem: Depression  Goal: Will be euthymic at discharge  Description: INTERVENTIONS:  1. Administer medication as ordered  2. Provide emotional support via 1:1 interaction with staff  3. Encourage involvement in milieu/groups/activities  4. Monitor for social isolation  Outcome: Progressing     Problem: Anxiety  Goal: Will report anxiety at manageable levels  Description: INTERVENTIONS:  1. Administer medication as ordered  2. Teach and rehearse alternative coping skills  3. Provide emotional support with 1:1 interaction with staff  Outcome: Progressing     Problem: Discharge Planning  Goal: Discharge to home or other facility with appropriate resources  Outcome: Progressing

## 2024-05-07 NOTE — ED PROVIDER NOTES
[facility] ED  EMERGENCY DEPARTMENT ENCOUNTER      Pt Name: Sil Gonzalez  MRN: [id number]  Birthdate 1955  Date of evaluation: 5/7/2024  Provider: Nasir Thomas MD  12:26 PM    CHIEF COMPLAINT       Chief Complaint   Patient presents with    Psychiatric Evaluation     Psych eval voluntary          HISTORY OF PRESENT ILLNESS    Sil Gonzalez is a 69 y.o. female who presents to the emergency department for evaluation of depression and anxiety.  States it has worsened over the past 2 weeks.  Passive thoughts of dying and access to medications, gun and knife.  She feels like her medications are not working.  Denies previous suicide attempts.  Denies HI/AVH.  Her  is at bedside and thinks that she needs more help  Chart review notes multiple telephone encounters recently regarding her depression and recent medication changes  HPI  Chart review notes history anxiety/depression, hypertension  Nursing Notes were reviewed.    REVIEW OF SYSTEMS       Review of Systems   Cardiovascular:  Negative for chest pain.   Gastrointestinal:  Negative for abdominal pain.   Psychiatric/Behavioral:          Depression/anxiety       Except as noted above the remainder of the review of systems was reviewed and negative.       PAST MEDICAL HISTORY     Past Medical History:   Diagnosis Date    Anxiety     Carcinoid (except of appendix)     Depression     GERD (gastroesophageal reflux disease)     Hypertension          SURGICAL HISTORY       Past Surgical History:   Procedure Laterality Date    BREAST SURGERY      CHOLECYSTECTOMY      COLON SURGERY      carcinoid    HYSTERECTOMY (CERVIX STATUS UNKNOWN)      LUMBAR FUSION      [facility]         CURRENT MEDICATIONS       Previous Medications    ACETAMINOPHEN (TYLENOL) 500 MG TABLET    Take 2 tablets by mouth every 6 hours as needed for Pain or Fever    ALBUTEROL (PROVENTIL HFA) 108 (90 BASE) MCG/ACT INHALER    Inhale 2 puffs into the  lungs every 6 hours as needed for Wheezing.    ALBUTEROL (VENTOLIN HFA) 108 (90 BASE) MCG/ACT INHALER    Inhale 2 puffs into the lungs every 6 hours as needed.    ALPRAZOLAM (XANAX) 0.25 MG TABLET    Take 1 tablet by mouth 2 times daily as needed for Sleep. Max Daily Amount: 0.5 mg    BUPROPION (WELLBUTRIN XL) 150 MG EXTENDED RELEASE TABLET    Take 1 tablet by mouth every morning    CLONAZEPAM (KLONOPIN) 1 MG TABLET    take 1 tablet by mouth twice a day if needed    FLUOXETINE (PROZAC) 40 MG CAPSULE    take 1 capsule by mouth once daily    IBUPROFEN (ADVIL;MOTRIN) 800 MG TABLET    Take 1 tablet by mouth 2 times daily as needed for Pain    LEVOTHYROXINE (LEVOTHROID) 25 MCG TABLET    Take 1 tablet by mouth daily.    LIDOCAINE (LIDODERM) 5 %    Place 1 patch onto the skin daily 12 hours on, 12 hours off.    LISINOPRIL (PRINIVIL;ZESTRIL) 5 MG TABLET    Take 1 tablet by mouth daily.    METOPROLOL (LOPRESSOR) 100 MG TABLET    take 1 tablet by mouth once daily    OMEPRAZOLE (PRILOSEC) 40 MG CAPSULE    Take 1 capsule by mouth daily    OXYCODONE-ACETAMINOPHEN (PERCOCET) 7.5-325 MG PER TABLET    Take 1 tablet by mouth every 8 hours as needed for Pain.    TRAZODONE (DESYREL) 50 MG TABLET    Take 1 tablet by mouth nightly       ALLERGIES     Dilaudid [hydromorphone hcl], Morphine, Pentazocine lactate, and Tramadol    FAMILY HISTORY       Family History   Problem Relation Age of Onset    Heart Failure Mother     Heart Failure Father           SOCIAL HISTORY       Social History     Socioeconomic History    Marital status:    Tobacco Use    Smoking status: Never    Smokeless tobacco: Never   Substance and Sexual Activity    Alcohol use: No    Drug use: No       SCREENINGS         Lavon Coma Scale  Eye Opening: Spontaneous  Best Verbal Response: Oriented  Best Motor Response: Obeys commands  K[place]il Coma Scale Score: 15                     CIWA Assessment  BP: (!) 151/73  Pulse: 76                 PHYSICAL EXAM       ED

## 2024-05-07 NOTE — ED NOTES
Call placed to Dr. Goss to review patient, message left, awaiting return call.   
Call to [facility] for bed assignment. Awaiting call back from Charge RN   
Changed into  hospital clothing with no skin breakdown noted & no contraband found @ this time. Urine specimen obtained & sent to lab. Oriented to SRI. Verbalizes understanding.Lab notified of need for blood draw.  
Lab at bedside, patient cooperative and tolerated well.   
Nursing report called to [facility] Staff by Sabina HOPKINS  
Reviewed patient with Dr. Goss, orders received to admit to [facility].   
To [facility] via W/C  
patient cooperative and pleasant      Trauma Identified:  Denies any trauma     Protective Factors:    Lives with   Seeking help      Risk Factors:    Not receiving psych services       Clinical Summary:    Patient presented to the ED as a walk in with her  for increased depression and anxiety. She reports only sleeping about 2 hours per night. She is having racing thoughts, states everything is just so repetitive. She is tearful and anxious while in the meche. She reports some passive suicidal thoughts as if she would be \"happier there.\" She has no plan but has been more frequently thinking about suicide. She also reports spending excessive money lately and feeling kind of bad about it at times. Just recently had all teeth removed and dental implants put in, is paying for her grandson house. Reports beginning of April over using her percocet to get through the day. She was hear about 2 weeks ago and given a dose of medication for anxiety and sent home. Today she went to her doctor office and he encouraged her to come to the ED for possible psych admission.         Level of Care Disposition:    Per Dr. Goss

## 2024-05-08 PROBLEM — F33.2 SEVERE EPISODE OF RECURRENT MAJOR DEPRESSIVE DISORDER, WITHOUT PSYCHOTIC FEATURES (HCC): Status: ACTIVE | Noted: 2024-05-07

## 2024-05-08 PROCEDURE — 6370000000 HC RX 637 (ALT 250 FOR IP): Performed by: REGISTERED NURSE

## 2024-05-08 PROCEDURE — 99223 1ST HOSP IP/OBS HIGH 75: CPT | Performed by: PSYCHIATRY & NEUROLOGY

## 2024-05-08 PROCEDURE — 6370000000 HC RX 637 (ALT 250 FOR IP): Performed by: PSYCHIATRY & NEUROLOGY

## 2024-05-08 PROCEDURE — 1240000000 HC EMOTIONAL WELLNESS R&B

## 2024-05-08 RX ORDER — QUETIAPINE FUMARATE 25 MG/1
12.5 TABLET, FILM COATED ORAL 2 TIMES DAILY
Status: DISCONTINUED | OUTPATIENT
Start: 2024-05-08 | End: 2024-05-10 | Stop reason: HOSPADM

## 2024-05-08 RX ORDER — LEVOTHYROXINE SODIUM 0.03 MG/1
25 TABLET ORAL DAILY
Status: DISCONTINUED | OUTPATIENT
Start: 2024-05-08 | End: 2024-05-10 | Stop reason: HOSPADM

## 2024-05-08 RX ORDER — ACETAMINOPHEN 80 MG
TABLET,CHEWABLE ORAL ONCE
Status: COMPLETED | OUTPATIENT
Start: 2024-05-08 | End: 2024-05-08

## 2024-05-08 RX ORDER — METOPROLOL TARTRATE 50 MG/1
100 TABLET, FILM COATED ORAL DAILY
Status: DISCONTINUED | OUTPATIENT
Start: 2024-05-08 | End: 2024-05-10 | Stop reason: HOSPADM

## 2024-05-08 RX ORDER — QUETIAPINE FUMARATE 50 MG/1
50 TABLET, FILM COATED ORAL NIGHTLY
Status: DISCONTINUED | OUTPATIENT
Start: 2024-05-08 | End: 2024-05-10 | Stop reason: HOSPADM

## 2024-05-08 RX ORDER — LISINOPRIL 5 MG/1
5 TABLET ORAL DAILY
Status: DISCONTINUED | OUTPATIENT
Start: 2024-05-08 | End: 2024-05-10 | Stop reason: HOSPADM

## 2024-05-08 RX ADMIN — HYDROXYZINE PAMOATE 50 MG: 50 CAPSULE ORAL at 20:26

## 2024-05-08 RX ADMIN — OXYCODONE AND ACETAMINOPHEN 1 TABLET: 7.5; 325 TABLET ORAL at 06:03

## 2024-05-08 RX ADMIN — FLUOXETINE HYDROCHLORIDE 40 MG: 20 CAPSULE ORAL at 09:35

## 2024-05-08 RX ADMIN — HYDROXYZINE PAMOATE 50 MG: 50 CAPSULE ORAL at 03:40

## 2024-05-08 RX ADMIN — QUETIAPINE FUMARATE 50 MG: 50 TABLET ORAL at 20:25

## 2024-05-08 RX ADMIN — OXYCODONE AND ACETAMINOPHEN 1 TABLET: 7.5; 325 TABLET ORAL at 14:03

## 2024-05-08 RX ADMIN — QUETIAPINE FUMARATE 12.5 MG: 25 TABLET ORAL at 12:23

## 2024-05-08 RX ADMIN — Medication: at 12:24

## 2024-05-08 RX ADMIN — OXYCODONE AND ACETAMINOPHEN 1 TABLET: 7.5; 325 TABLET ORAL at 22:09

## 2024-05-08 RX ADMIN — LISINOPRIL 5 MG: 5 TABLET ORAL at 12:23

## 2024-05-08 RX ADMIN — HYDROXYZINE PAMOATE 50 MG: 50 CAPSULE ORAL at 10:44

## 2024-05-08 ASSESSMENT — PATIENT HEALTH QUESTIONNAIRE - PHQ9
SUM OF ALL RESPONSES TO PHQ9 QUESTIONS 1 & 2: 6
5. POOR APPETITE OR OVEREATING: NEARLY EVERY DAY
7. TROUBLE CONCENTRATING ON THINGS, SUCH AS READING THE NEWSPAPER OR WATCHING TELEVISION: MORE THAN HALF THE DAYS
1. LITTLE INTEREST OR PLEASURE IN DOING THINGS: NEARLY EVERY DAY
SUM OF ALL RESPONSES TO PHQ QUESTIONS 1-9: 22
SUM OF ALL RESPONSES TO PHQ QUESTIONS 1-9: 22
SUM OF ALL RESPONSES TO PHQ QUESTIONS 1-9: 19
10. IF YOU CHECKED OFF ANY PROBLEMS, HOW DIFFICULT HAVE THESE PROBLEMS MADE IT FOR YOU TO DO YOUR WORK, TAKE CARE OF THINGS AT HOME, OR GET ALONG WITH OTHER PEOPLE: SOMEWHAT DIFFICULT
6. FEELING BAD ABOUT YOURSELF - OR THAT YOU ARE A FAILURE OR HAVE LET YOURSELF OR YOUR FAMILY DOWN: MORE THAN HALF THE DAYS
9. THOUGHTS THAT YOU WOULD BE BETTER OFF DEAD, OR OF HURTING YOURSELF: NEARLY EVERY DAY
4. FEELING TIRED OR HAVING LITTLE ENERGY: NEARLY EVERY DAY
8. MOVING OR SPEAKING SO SLOWLY THAT OTHER PEOPLE COULD HAVE NOTICED. OR THE OPPOSITE, BEING SO FIGETY OR RESTLESS THAT YOU HAVE BEEN MOVING AROUND A LOT MORE THAN USUAL: NOT AT ALL
2. FEELING DOWN, DEPRESSED OR HOPELESS: NEARLY EVERY DAY
3. TROUBLE FALLING OR STAYING ASLEEP: NEARLY EVERY DAY
SUM OF ALL RESPONSES TO PHQ QUESTIONS 1-9: 22

## 2024-05-08 ASSESSMENT — PAIN SCALES - GENERAL
PAINLEVEL_OUTOF10: 2
PAINLEVEL_OUTOF10: 9
PAINLEVEL_OUTOF10: 9
PAINLEVEL_OUTOF10: 2
PAINLEVEL_OUTOF10: 10
PAINLEVEL_OUTOF10: 10

## 2024-05-08 ASSESSMENT — PAIN DESCRIPTION - ORIENTATION
ORIENTATION: LEFT;RIGHT
ORIENTATION: RIGHT

## 2024-05-08 ASSESSMENT — PAIN DESCRIPTION - LOCATION
LOCATION: MOUTH
LOCATION: JAW
LOCATION: MOUTH

## 2024-05-08 ASSESSMENT — PAIN DESCRIPTION - DESCRIPTORS
DESCRIPTORS: THROBBING
DESCRIPTORS: ACHING;THROBBING

## 2024-05-08 NOTE — GROUP NOTE
Group Therapy Note    Date: 5/8/2024    Group Start Time: 1000  Group End Time: 1050  Group Topic: Art Therapy     [facility] Jacki Goins, BELLO        Group Therapy Note    Attendees: 5       Patient's Goal:  To get better and be happy.    Notes:  Patient did not attend.     Modes of Intervention: Activity      Discipline Responsible: Psychoeducational Specialist      Signature:  Jacki VELASCO

## 2024-05-08 NOTE — GROUP NOTE
Date: 5/8/2024    Group Start Time: 0920  Group End Time: 0945  Group Topic: Music Therapy    [facility] Dipti Bartlett    Morning Check-In & Music Therapy Group  Patients will listen to \"Circles\" by Post Liz and identify relevant lyrics, themes and interpretations derived from the song. Patients will discuss negative cycles/patterns they're in, explain why they might be repeating the same behaviors, and brainstorm on ideas of how to break the cycle.     Focus:  Breaking Negative Cycles and Validation/Support    Goals: Improve Mood, Improve Insight/Self-Awareness, Increase Socialization/Community Building, Increase Self-Expression, Improve Attention to Task, Improve Coping Skills/Develop Coping Skills    Patient's Goal: to get better and be happy  Attendance: Did not attend  Modes of Intervention: Nolvia Analysis and Song Discussion and Receptive Music Listening    Discipline Responsible: Music Therapist  Signature: LOPEZ Caraballo, PsychEd Spec

## 2024-05-08 NOTE — CARE COORDINATION
5/8/2024 @ 0912 - Patient was approached regarding completing the Leisure Assessment. Patient presented as anxious and with excessive worry. She seemed apologetic for her current mental health concern. Patient stated she had her life under control. She  her partner at the end of 2023. Patient stated she enjoys doing things with her . Cooking, cleaning, grocery shopping is how she spends time with her partner. Patient mentioned her two adult children. She is proud of their accomplishments but confused about why her son does not speak to her. Patient's  is her sole support at this time. Both emotionally and financially. She verbalized being proud of her relationship with her . Patient seems willing to get the help see needs. He goal is to be happier.     Documentation completed by: Jacki Saldaña MA ATR

## 2024-05-08 NOTE — CARE COORDINATION
Psychosocial Assessment    Current Level of Psychosocial Functioning     Independent x  Dependent    Minimal Assist     Comments:      Psychosocial High Risk Factors (check all that apply)    Unable to obtain meds   Chronic illness/pain  x  Substance abuse   Lack of Family Support   Financial stress x  Isolation x  Inadequate Community Resources x  Suicide attempt(s)  Not taking medications   Victim of crime   Developmental Delay  Unable to manage personal needs    Age 65 or older   Homeless  No transportation   Readmission within 30 days  Unemployment  Traumatic Event recent dental implants    Family/Supports identified:    and grandson Jose Manuel  Sexual Orientation:    Undisclosed  Patient Strengths:  Patient has housing; supportive family; income; has Jason number.  Patient Barriers:   Patient struggling with unresolved grief from her 's death in 2021; depression over anniversary dates of loved ones death.  Safety plan:  Patient to attend IOP ; therapist; medication management; pain management; .  CMHC/MH history:  Severe episode of recurrent MDD  Plan of Care:  medication management, group/individual therapies, family meetings, psycho -education, treatment team meetings to assist with stabilization    Initial Discharge Plan:    Patient to return home with ; may benefit from IOP; individual therapist and pain management; .  Clinical Summary:    Patient is 69 yr old  white female who lives with her . Patient is retired from [employer].  Patient presents slightly guarded and irritable; but able to engage shortly into the assessment. Patient admits to grieving loss of  in 2021 and moving on quickly to her current . Patient admits to sad about anniversary dates of her sister passing April 5 in 2020 as a trigger. Patient having severe pain after  dental implants and insomnia which she feels is contributing to her SI. Patient prescribed xanax and percocet  for anxiety and pain. Patient  Reports her threat of overdosing on pills in front of her  and grandson was an attempt  for attention to her depression as she feels  \"doesn't understand\". Patient agreeable to IOP program and/or individual therapy.

## 2024-05-08 NOTE — PROGRESS NOTES
Behavioral Services  Medicare Certification Upon Admission    I certify that this patient's inpatient psychiatric hospital admission is medically necessary for:    [x] (1) Treatment which could reasonably be expected to improve this patient's condition,       [x] (2) Or for diagnostic study;     AND     [x](2) The inpatient psychiatric services are provided while the individual is under the care of a physician and are included in the individualized plan of care.    Estimated length of stay/service 3-5    Plan for post-hospital care Op care    Electronically signed by CHRISTINA SHELL MD on 5/8/2024 at 9:38 AM

## 2024-05-08 NOTE — H&P
[facility] - Department of Psychiatry    History and Physical - Adult         CHIEF COMPLAINT:  Depression SI      History obtained from:  patient    Patient was seen after discussing with the treatment team and reviewing the chart        CIRCUMSTANCES OF ADMISSION:     Patient presented to the ED as a walk in with her  for increased depression and anxiety. She reports only sleeping about 2 hours per night. She is having racing thoughts, states everything is just so repetitive. She is tearful and anxious while in the meche. She reports some passive suicidal thoughts as if she would be \"happier there.\" She has no plan but has been more frequently thinking about suicide. She also reports spending excessive money lately and feeling kind of bad about it at times. Just recently had all teeth removed and dental implants put in, is paying for her grandson house. Reports beginning of April over using her percocet to get through the day. She was hear about 2 weeks ago and given a dose of medication for anxiety and sent home. Today she went to her doctor office and he encouraged her to come to the ED for possible psych admission.     HISTORY OF PRESENT ILLNESS:      The patient is a 69 y.o. female  living with , 2 adult children with significant past history of MDD    Pt has been feeling depressed for the past month, getting worse every day.  Stressors:pt had a dental implant done last week and it is painful, still struggling to eat  Son stopped talking to her 15 years ago- no reason why he did that.   Talk to her daughter  Chronic pain - on prescribed opiates  Severity: Rating mood to be around 1/10 (10- good)  Quality:melancholic  Worse in the morning  Content: Hopeless, worthless and helpless feeling  Suicidal thoughts - pt was contemplating to take overdose with her percocet but did not. Her grandson found her with the pills in her hand  Associated symptoms:  Poor concentration,  \"VALPROATE\", \"CBMZ\"  No results found for: \"LITHIUM\", \"VALPROATE\"    FURTHER LABS ORDERED :      Radiology   XR CHEST PORTABLE    Result Date: 4/13/2024  EXAMINATION: ONE XRAY VIEW OF THE CHEST 4/13/2024 4:33 pm COMPARISON: 12/08/2021 HISTORY: ORDERING SYSTEM PROVIDED HISTORY: chest pain TECHNOLOGIST PROVIDED HISTORY: Reason for exam:->chest pain What reading provider will be dictating this exam?->CRC FINDINGS: The lungs are without acute focal process.  There is no effusion or pneumothorax. The cardiomediastinal silhouette is without acute process. The osseous structures are without acute process.     No acute process.       EKG: TRACING REVIEWED    TREATMENT PLAN:    Risk Management:  close watch and suicide risk    This patient was assessed for Medical bed necessity for the following reason:  N/A    Collateral Information:  Will obtain collateral information from the family or friends.  Will obtain medical records as appropriate from out patient providers  Will consult the hospitalist for a physical exam to rule out any co-morbid physical condition.    Home medication Reconciled       New Medications started during this admission :    See orders  Prn Haldol 5mg and Vistaril 50mg q6hr for extreme agitation.  Trazodone as ordered for insomnia  Vistaril as ordered for anxiety  Discussed with the patient risk, benefit, alternative and common side effects for the  proposed medication treatment. Patient is consenting to the treatment.    Psychotherapy:   Encourage participation in milieu and group therapy  Individual therapy as needed      Electronically signed by CHRISTINA SHELL MD on 5/8/2024 at 9:39 AM

## 2024-05-08 NOTE — CONSULTS
daily  Patient not taking: Reported on 5/7/2024    Provider, Marlen, MD        Allergies   Dilaudid [hydromorphone hcl], Morphine, Pentazocine lactate, and Tramadol    Social History     Social History       Tobacco History       Smoking Status  Never      Smokeless Tobacco Use  Never              Alcohol History       Alcohol Use Status  No              Drug Use       Drug Use Status  No              Sexual Activity       Sexually Active  Not Asked                    Family History     Family History   Problem Relation Age of Onset    Heart Failure Mother     Heart Failure Father        Review of Systems   12 point review of systems reviewed with patient; negative other than as mentioned    Physical Exam   BP (!) 140/74 Comment: RN NOTIFIED  Pulse 85   Temp 97.7 °F (36.5 °C) (Oral)   Resp 16   Ht 1.626 m (5' 4\")   Wt 77.1 kg (170 lb)   SpO2 98%   BMI 29.18 kg/m²       CONSTITUTIONAL:  Awake, alert, no apparent distress, and appears stated age  EYES: pupils equal, round and reactive to light   NECK:  Supple   LUNGS:  No increased work of breathing, good air exchange, clear to auscultation bilaterally, no crackles or wheezing  CARDIOVASCULAR:  Normal apical impulse, regular rate and rhythm  ABDOMEN:  No scars, normal bowel sounds, soft, non-distended, non-tender  MUSCULOSKELETAL:  There is no redness, warmth, or swelling of the joints.  Full range of motion noted  NEUROLOGIC:  Awake, alert.  No focal deficits noted  SKIN:  No bruising or bleeding, normal skin color, texture, turgor, no redness, warmth, or swelling, no rashes, and no lesions    Labs      Recent Results (from the past 24 hour(s))   Hemoglobin A1C    Collection Time: 05/07/24 11:20 AM   Result Value Ref Range    Hemoglobin A1C 5.2 4.0 - 6.0 %    Estimated Avg Glucose 103 mg/dL   CK    Collection Time: 05/07/24 11:20 AM   Result Value Ref Range    Total CK 84 0 - 170 U/L   Acetaminophen Level    Collection Time: 05/07/24 11:20 AM   Result  Value Ref Range    Acetaminophen Level <5 (L) 10 - 30 ug/mL   CBC with Auto Differential    Collection Time: 05/07/24 11:20 AM   Result Value Ref Range    WBC 4.4 (L) 4.8 - 10.8 K/uL    RBC 3.69 (L) 4.20 - 5.40 M/uL    Hemoglobin 11.8 (L) 12.0 - 16.0 g/dL    Hematocrit 35.3 (L) 37.0 - 47.0 %    MCV 95.7 (H) 79.4 - 94.8 fL    MCH 32.0 (H) 27.0 - 31.3 pg    MCHC 33.4 33.0 - 37.0 %    RDW 13.2 11.5 - 14.5 %    Platelets 225 130 - 400 K/uL    Neutrophils % 80.4 %    Lymphocytes % 12.4 %    Monocytes % 5.9 %    Eosinophils % 0.9 %    Basophils % 0.2 %    Neutrophils Absolute 3.6 1.4 - 6.5 K/uL    Lymphocytes Absolute 0.6 (L) 1.0 - 4.8 K/uL    Monocytes Absolute 0.3 0.2 - 0.8 K/uL    Eosinophils Absolute 0.0 0.0 - 0.7 K/uL    Basophils Absolute 0.0 0.0 - 0.2 K/uL   Ethanol    Collection Time: 05/07/24 11:20 AM   Result Value Ref Range    Ethanol Lvl <10 mg/dL    Ethanol percent Not indicated G/dL   Lipid Panel    Collection Time: 05/07/24 11:20 AM   Result Value Ref Range    Cholesterol, Total 146 0 - 199 mg/dL    Triglycerides 139 0 - 150 mg/dL    HDL 44 40 - 59 mg/dL    LDL Cholesterol 74 0 - 129 mg/dL   Salicylate    Collection Time: 05/07/24 11:20 AM   Result Value Ref Range    Salicylate, Serum <0.3 (L) 15.0 - 30.0 mg/dL   TSH    Collection Time: 05/07/24 11:20 AM   Result Value Ref Range    TSH 1.050 0.440 - 3.860 uIU/mL   Comprehensive Metabolic Panel    Collection Time: 05/07/24 11:20 AM   Result Value Ref Range    Sodium 140 135 - 144 mEq/L    Potassium 3.9 3.4 - 4.9 mEq/L    Chloride 103 95 - 107 mEq/L    CO2 26 20 - 31 mEq/L    Anion Gap 11 9 - 15 mEq/L    Glucose 138 (H) 70 - 99 mg/dL    BUN 9 8 - 23 mg/dL    Creatinine 1.41 (H) 0.50 - 0.90 mg/dL    Est, Glom Filt Rate 39.8 (L) >60    Calcium 8.6 8.5 - 9.9 mg/dL    Total Protein 7.1 6.3 - 8.0 g/dL    Albumin 4.0 3.5 - 4.6 g/dL    Total Bilirubin 0.4 0.2 - 0.7 mg/dL    Alkaline Phosphatase 98 40 - 130 U/L    ALT 10 0 - 33 U/L    AST 19 0 - 35 U/L    Globulin

## 2024-05-08 NOTE — PLAN OF CARE
Patient calm and cooperative with shift assessment.  Patient appeared sad, worried and depressed saying, \"I'm on medications but they aren't helping.  I feel hopeless and depressed and I don't know why.\"  Patient described her mood today as \"hopeless.\"  Patient endorsing racing thoughts which she says makes it difficult for her to sleep at night.  Patient stated, \"I can't sleep at night from so many thoughts in my head.\"  Patient reports her medications are not helping and that is why she is here.  Patient goal today is to attend groups.  Patient rated anxiety 9/10 and depression 10/10 on a scale of 1-10 with ten being the worst.  Patient denying SI/HI/AVH.  Patient encouraged to socialize with peers, attend groups and shower today.  To continue to monitor.   Problem: Self Harm/Suicidality  Goal: Will have no self-injury during hospital stay  Description: INTERVENTIONS:  1.  Ensure constant observer at bedside with Q15M safety checks  2.  Maintain a safe environment  3.  Secure patient belongings  4.  Ensure family/visitors adhere to safety recommendations  5.  Ensure safety tray has been added to patient's diet order  6.  Every shift and PRN: Re-assess suicidal risk via Frequent Screener    5/8/2024 1003 by Nba Dunaway RN  Outcome: Progressing  Flowsheets (Taken 5/8/2024 0946)  Will have no self-injury during hospital stay:   Maintain a safe environment   Secure patient belongings  5/7/2024 2100 by Shannon Corrigan RN  Outcome: Progressing     Problem: Pain  Goal: Verbalizes/displays adequate comfort level or baseline comfort level  5/8/2024 1003 by Nab Dunaway, RN  Outcome: Progressing  5/7/2024 2100 by Shannon Corrigan RN  Outcome: Not Progressing     Problem: Risk for Elopement  Goal: Patient will not exit the unit/facility without proper excort  5/8/2024 1003 by Nba Dunaway, RN  Outcome: Progressing  Flowsheets (Taken 5/8/2024 0946)  Nursing Interventions for Elopement Risk:   Reduce environmental  triggers   Make sure patient has all necessary personal care items  5/7/2024 2100 by Shannon Corrigan RN  Outcome: Progressing     Problem: Depression  Goal: Will be euthymic at discharge  Description: INTERVENTIONS:  1. Administer medication as ordered  2. Provide emotional support via 1:1 interaction with staff  3. Encourage involvement in milieu/groups/activities  4. Monitor for social isolation  5/8/2024 1003 by Nba Dunaway RN  Outcome: Progressing  5/7/2024 2100 by Shannon Corrigan RN  Outcome: Progressing     Problem: Anxiety  Goal: Will report anxiety at manageable levels  Description: INTERVENTIONS:  1. Administer medication as ordered  2. Teach and rehearse alternative coping skills  3. Provide emotional support with 1:1 interaction with staff  5/8/2024 1003 by Nba Dunaway RN  Outcome: Progressing  Flowsheets (Taken 5/8/2024 0946)  Will report anxiety at manageable levels: Provide emotional support with 1:1 interaction with staff  5/7/2024 2100 by Shannon Corrigan RN  Outcome: Progressing     Problem: Discharge Planning  Goal: Discharge to home or other facility with appropriate resources  5/8/2024 1003 by Nba Dunaway RN  Outcome: Progressing  5/7/2024 2100 by Shannon Corrigan RN  Outcome: Progressing     Problem: Safety - Adult  Goal: Free from fall injury  Outcome: Progressing     Problem: Pain  Goal: Verbalizes/displays adequate comfort level or baseline comfort level  5/8/2024 1003 by Nba Dunaway RN  Outcome: Progressing  5/7/2024 2100 by Shannon Corrigan RN  Outcome: Not Progressing

## 2024-05-08 NOTE — PROGRESS NOTES
Pt is noted reading a book in bed, explained newly ordered meds and pt aware and agreeable to take Prozac now, pt did not recognize synthroid and refused it, pt refused lopressor 100 mg as she states it makes her hr go to low, explained and gave Seroquel 12.5, pt expressed being on percocet's ordered for restless legs from dr uDmas, re-explained vistaril that was given earlier, pt reports she is willing to try the new meds for anxiety, patients memory is noted to be poor and everything was repeated several times, pt is asking for percocet now for bone pain, tylenol was suggested. Pt was informed she can ask for percocet and vistaril every 6 hours.

## 2024-05-08 NOTE — CARE COORDINATION
Brief Intervention and Referral to Treatment Summary    Patient was provided PHQ-9, AUDIT-C and DAST Screening:      PHQ-9 Score: 22  AUDIT-C Score:  0  DAST Score:  0    Patient’s substance use is considered N/A    Low Risk/Healthy  Moderate Risk  Harmful  Dependent    Patient’s depression is considered:     Minimal  Mild   Moderate  Moderately Severe  Severe x    Brief Education Was Provided N/A    Patient was receptive  Patient was not receptive      Brief Intervention Is Provided (Only for AUDIT-C or DAST) N/A    Patient reports readiness to decrease and/or stop use and a plan was discussed   Patient denies readiness to decrease and/or stop use and a plan was not discussed      Recommendations/Referrals for Brief and/or Specialized Treatment Provided to Patient    N/A

## 2024-05-08 NOTE — PROGRESS NOTES
Pt awake to bathroom at 0315. Reports slept 4/hr and woke up from a dream. Reports pain on right side of mouth. Informed pt it was to early for pain pill. Medicated with vistaril 50mg po at 0340 to assist with sleep.

## 2024-05-08 NOTE — GROUP NOTE
Date: 5/8/2024    Group Start Time: 1330  Group End Time: 1420  Group Topic: Music Therapy    [facility] 3W Dipti Bartlett    Build Your Own Beat  Patients will be educated on fundamental rhythmic aspects of music (beat, simple notation, how to read/play notation). Patients will randomly select an instrument and rhythmic pattern from a list of options. Patients will learn/review their rhythm(s) on their instrument(s), and play their individual part(s) in the group all together. Patients will collaborate with peers to stay \"in time\" with one another and perform their composition.      Goals: Improve Interpersonal Communication Skills, Increase Creative Self-Expression, Improve Self-Esteem, Increase Mood, Increase Socialization, Develop Coping Skills / Outlet for Expression, Improve Self-Awareness / Insight, Improve/Maintain Attention to Task, Improve/Maintain Cognitive Functioning    Focus: Interpersonal Communication Skills, Creative Expression, Socialization, Cognitive Skills     Attendance: Did not attend  Modes of Intervention: Active Music Making, Composition, Improvisation, and Receptive Music Listening    Discipline Responsible: Music Therapist  Signature: Dipti Almaraz, LOPEZ, PsychEd Spec

## 2024-05-08 NOTE — PROGRESS NOTES
Explained and pt viewed wrapper, Gave percocet 7.5-325mg ,. Pt was informed again she can have every 6 hours that she needs to ask for it.

## 2024-05-08 NOTE — PLAN OF CARE
Problem: Self Harm/Suicidality  Goal: Will have no self-injury during hospital stay  Description: INTERVENTIONS:  1.  Ensure constant observer at bedside with Q15M safety checks  2.  Maintain a safe environment  3.  Secure patient belongings  4.  Ensure family/visitors adhere to safety recommendations  5.  Ensure safety tray has been added to patient's diet order  6.  Every shift and PRN: Re-assess suicidal risk via Frequent Screener    5/7/2024 2100 by Shannon Corrigan RN  Outcome: Progressing  5/7/2024 1507 by Petrona Linares RN  Outcome: Progressing  5/7/2024 1507 by Petrona Linares RN  Outcome: Progressing     Problem: Risk for Elopement  Goal: Patient will not exit the unit/facility without proper excort  5/7/2024 2100 by Shannon Corrigan RN  Outcome: Progressing  5/7/2024 1507 by Petrona Linares RN  Outcome: Progressing  5/7/2024 1507 by Petrona Linares RN  Outcome: Progressing  Flowsheets (Taken 5/7/2024 1448)  Nursing Interventions for Elopement Risk:   Reduce environmental triggers   Assist with personal care needs such as toileting, eating, dressing, as needed to reduce the risk of wandering   Make sure patient has all necessary personal care items     Problem: Depression  Goal: Will be euthymic at discharge  Description: INTERVENTIONS:  1. Administer medication as ordered  2. Provide emotional support via 1:1 interaction with staff  3. Encourage involvement in milieu/groups/activities  4. Monitor for social isolation  5/7/2024 2100 by Shannon Corrigan RN  Outcome: Progressing  5/7/2024 1507 by Petrona Linares RN  Outcome: Progressing     Problem: Anxiety  Goal: Will report anxiety at manageable levels  Description: INTERVENTIONS:  1. Administer medication as ordered  2. Teach and rehearse alternative coping skills  3. Provide emotional support with 1:1 interaction with staff  5/7/2024 2100 by Shannon Corrigan RN  Outcome: Progressing  5/7/2024 1507 by Petrona Linares RN  Outcome: Progressing

## 2024-05-09 PROCEDURE — 99232 SBSQ HOSP IP/OBS MODERATE 35: CPT | Performed by: PSYCHIATRY & NEUROLOGY

## 2024-05-09 PROCEDURE — 90833 PSYTX W PT W E/M 30 MIN: CPT | Performed by: PSYCHIATRY & NEUROLOGY

## 2024-05-09 PROCEDURE — 1240000000 HC EMOTIONAL WELLNESS R&B

## 2024-05-09 PROCEDURE — 6370000000 HC RX 637 (ALT 250 FOR IP): Performed by: PSYCHIATRY & NEUROLOGY

## 2024-05-09 PROCEDURE — 6370000000 HC RX 637 (ALT 250 FOR IP): Performed by: REGISTERED NURSE

## 2024-05-09 RX ADMIN — QUETIAPINE FUMARATE 12.5 MG: 25 TABLET ORAL at 09:05

## 2024-05-09 RX ADMIN — OXYCODONE AND ACETAMINOPHEN 1 TABLET: 7.5; 325 TABLET ORAL at 17:39

## 2024-05-09 RX ADMIN — QUETIAPINE FUMARATE 12.5 MG: 25 TABLET ORAL at 14:32

## 2024-05-09 RX ADMIN — HYDROXYZINE PAMOATE 50 MG: 50 CAPSULE ORAL at 21:22

## 2024-05-09 RX ADMIN — FLUOXETINE HYDROCHLORIDE 40 MG: 20 CAPSULE ORAL at 09:06

## 2024-05-09 RX ADMIN — OXYCODONE AND ACETAMINOPHEN 1 TABLET: 7.5; 325 TABLET ORAL at 09:05

## 2024-05-09 RX ADMIN — QUETIAPINE FUMARATE 50 MG: 50 TABLET ORAL at 21:22

## 2024-05-09 RX ADMIN — HALOPERIDOL 5 MG: 5 TABLET ORAL at 00:27

## 2024-05-09 RX ADMIN — LISINOPRIL 5 MG: 5 TABLET ORAL at 09:06

## 2024-05-09 ASSESSMENT — PAIN DESCRIPTION - LOCATION: LOCATION: MOUTH

## 2024-05-09 ASSESSMENT — PAIN SCALES - GENERAL
PAINLEVEL_OUTOF10: 0
PAINLEVEL_OUTOF10: 9
PAINLEVEL_OUTOF10: 9

## 2024-05-09 ASSESSMENT — PAIN DESCRIPTION - ORIENTATION: ORIENTATION: UPPER;LOWER

## 2024-05-09 ASSESSMENT — PAIN DESCRIPTION - DESCRIPTORS: DESCRIPTORS: ACHING;THROBBING

## 2024-05-09 NOTE — PLAN OF CARE
Shift 3 [facility]  assessment completed.  Patient assessed and interviewed:while resting in bed, tearful, worried, isolated in room    Complaints of:  oral/ dental pain      Precautions:  STANDARD              Falls:0      Aries:   21           Chart and meds reviewed.           Noted Labs:   PT REPORTED: Anxiety:  10  Depression: 10       Sleep:  poor   Appetite:   poor  Patient denies SI, HI, and hallucinations at this time.  Pt currently denies needs at this time.  Will continue to monitor and address needs as they arise.  GOALS:  1130 pt spouse came to visit. Pt declines to leave room for visitor. Remains tearful. Lunch bought to room. Pt declines. Ensure offered.     Problem: Self Harm/Suicidality  Goal: Will have no self-injury during hospital stay  Description: INTERVENTIONS:  1.  Ensure constant observer at bedside with Q15M safety checks  2.  Maintain a safe environment  3.  Secure patient belongings  4.  Ensure family/visitors adhere to safety recommendations  5.  Ensure safety tray has been added to patient's diet order  6.  Every shift and PRN: Re-assess suicidal risk via Frequent Screener    5/9/2024 1003 by Acacia Aviles RN  Outcome: Progressing  5/8/2024 2046 by Shannon Corrigan RN  Outcome: Progressing     Problem: Pain  Goal: Verbalizes/displays adequate comfort level or baseline comfort level  5/9/2024 1003 by Acacia Aviles RN  Outcome: Progressing  5/8/2024 2046 by Shannon Corrigan RN  Outcome: Progressing     Problem: Risk for Elopement  Goal: Patient will not exit the unit/facility without proper excort  5/9/2024 1003 by Acacia Aviles RN  Outcome: Progressing  5/8/2024 2046 by Shannon Corrigan RN  Outcome: Progressing     Problem: Depression  Goal: Will be euthymic at discharge  Description: INTERVENTIONS:  1. Administer medication as ordered  2. Provide emotional support via 1:1 interaction with staff  3. Encourage involvement in milieu/groups/activities  4.  Monitor for social isolation  5/9/2024 1003 by Acacia Aviles RN  Outcome: Progressing  5/8/2024 2046 by Shannon Corrigan RN  Outcome: Not Progressing     Problem: Anxiety  Goal: Will report anxiety at manageable levels  Description: INTERVENTIONS:  1. Administer medication as ordered  2. Teach and rehearse alternative coping skills  3. Provide emotional support with 1:1 interaction with staff  5/9/2024 1003 by Acacia Aviles RN  Outcome: Progressing  5/8/2024 2046 by Shannon Corrigan RN  Outcome: Not Progressing     Problem: Behavior  Goal: Pt/Family maintain appropriate behavior and adhere to behavioral management agreement, if implemented  Description: INTERVENTIONS:  1. Assess patient/family's coping skills and  non-compliant behavior (including use of illegal substances)  2. Notify security of behavior or suspected illegal substances which indicate the need for search of the family and/or belongings  3. Encourage verbalization of thoughts and concerns in a socially appropriate manner  4. Utilize positive, consistent limit setting strategies supporting safety of patient, staff and others  5. Encourage participation in the decision making process about the behavioral management agreement  6. If a visitor's behavior poses a threat to safety call refer to organization policy.  7. Initiate consult with , Psychosocial CNS, Spiritual Care as appropriate  Outcome: Progressing     Problem: Sleep Disturbance  Goal: Will exhibit normal sleeping pattern  Description: INTERVENTIONS:  1. Administer medication as ordered  2. Decrease environmental stimuli, including noise, as appropriate  3. Discourage social isolation and naps during the day  Outcome: Progressing     Problem: Self Care Deficit  Goal: Return ADL status to a safe level of function  Description: INTERVENTIONS:  1. Administer medication as ordered  2. Assess ADL deficits and provide assistive devices as needed  3. Obtain PT/OT

## 2024-05-09 NOTE — PROGRESS NOTES
Pt to NS at 0015,requested this writer to come in her room. Pt stated she just woke up,and feels like her heart is racing and short of breath. Noted prn xanax was dc'd today, last dose being on 5-7-24 at 2033. Pt reports being on xanax only for the past 2 weeks. Bp 134/63 p85 r18  100%R/A. Pt c/o being severely anxious. Pt is fretful,worrisome.

## 2024-05-09 NOTE — PROGRESS NOTES
Pt is appears to be sleeping , noted batting her closed eyes , tapped and pt awakened , explained and gave am meds gave percocet 7.5-325 as requested, pt refused to give a number for pain, pt refused lopressor and synthroid , mouth check was done,

## 2024-05-09 NOTE — PROGRESS NOTES
Comprehensive Nutrition Assessment    Type and Reason for Visit:  Initial, Positive Nutrition Screen (+ malnutriton screen)    Nutrition Recommendations/Plan:   Continue General diet as tolerated, offer soft foods, chopped meats as needed  Continue with high calorie high protein oral supplement until oral intake > 50% of trays  Obtain actual weight for further assessment       Malnutrition Assessment:  Malnutrition Status:  No malnutrition (05/09/24 1333)        Nutrition Assessment:       Nutrition Referral received for decreased oral intake & / or reported weight loss . Home nutrition suppport ( entered erroneously) .   No malnutrition present based on available information. Pt currently admitted on behavioral health floor. Anticipate improvement in oral intake with inpatient psychiatric treatment  & /or medication compliance  General diet appropriate at this time. Actual weight ordered for further assessment. please consult dietitain if there is a change in condition or additional needs arise.    Nutrition Related Findings:    admitted for anxiety and depression; patient also had passive thoughts of dying she has access to medications as well as a gun denies.  Patient reported that her medications were not working.  Patient with a history of previous suicide attempt.  Patient has a past medical history of anxiety, depression, hypertension and GERD.  Patient denies suicide ideation at this time.  Patient reports mild pain from just recently having dental implants Wound Type: None       Current Nutrition Intake & Therapies:    Average Meal Intake:  (' poor')  Average Supplements Intake: Unable to assess  ADULT DIET; Dysphagia - Pureed  ADULT ORAL NUTRITION SUPPLEMENT; Breakfast, Lunch, Dinner; Standard High Calorie/High Protein Oral Supplement    Anthropometric Measures:  Height: 162.6 cm (5' 4\")  Ideal Body Weight (IBW): 120 lbs (55 kg)    Admission Body Weight: 77.1 kg (170 lb) (stated)  Current Body Weight:   ,n/a   IBW.  UTD  Current BMI (kg/m2):  UTD  Usual Body Weight: 78.5 kg (173 lb) (( 7/2023), 180# ( 4/7/24- DR office), 189# ( 2/2024))                       BMI Categories: Overweight (BMI 25.0-29.9) (est)        Nutrition Diagnosis:   Inadequate oral intake related to cognitive or neurological impairment, biting/chewing (masticatory) difficulty as evidenced by intake 0-25%    Nutrition Interventions:   Food and/or Nutrient Delivery: Continue Current Diet, Continue Oral Nutrition Supplement  Nutrition Education/Counseling: Education not indicated  Coordination of Nutrition Care: Continue to monitor while inpatient       Goals:     Goals: PO intake 50% or greater, by next RD assessment, other (specify)  Specify Other Goals: obtain actual wt    Nutrition Monitoring and Evaluation:   Behavioral-Environmental Outcomes: None Identified  Food/Nutrient Intake Outcomes: Food and Nutrient Intake, Supplement Intake, Diet Advancement/Tolerance  Physical Signs/Symptoms Outcomes: Meal Time Behavior, Weight, Chewing or Swallowing    Discharge Planning:    Too soon to determine     KENDALL VINSON RD, LD

## 2024-05-09 NOTE — PLAN OF CARE
Nutrition Problem #1: Inadequate oral intake  Intervention: Food and/or Nutrient Delivery: Continue Current Diet, Continue Oral Nutrition Supplement  Nutritional

## 2024-05-09 NOTE — PROGRESS NOTES
Pt did not attend [id number] Wrap Up Group despite staff encouragement.         Electronically signed by Thuy Qureshi on 5/8/2024 at 9:34 PM

## 2024-05-09 NOTE — PLAN OF CARE
belongings  4.  Ensure family/visitors adhere to safety recommendations  5.  Ensure safety tray has been added to patient's diet order  6.  Every shift and PRN: Re-assess suicidal risk via Frequent Screener    5/8/2024 2046 by Shannon Corrigan RN  Outcome: Progressing  5/8/2024 1003 by Nba Dunaway RN  Outcome: Progressing  Flowsheets (Taken 5/8/2024 0946)  Will have no self-injury during hospital stay:   Maintain a safe environment   Secure patient belongings     Problem: Pain  Goal: Verbalizes/displays adequate comfort level or baseline comfort level  5/8/2024 2046 by Shannon Corrigan RN  Outcome: Progressing  5/8/2024 1003 by Nba Dunaway RN  Outcome: Progressing     Problem: Risk for Elopement  Goal: Patient will not exit the unit/facility without proper excort  5/8/2024 2046 by Shannon Corrigan RN  Outcome: Progressing  5/8/2024 1003 by Nba Dunaway RN  Outcome: Progressing  Flowsheets (Taken 5/8/2024 0946)  Nursing Interventions for Elopement Risk:   Reduce environmental triggers   Make sure patient has all necessary personal care items     Problem: Discharge Planning  Goal: Discharge to home or other facility with appropriate resources  5/8/2024 2046 by Shannon Corrigan RN  Outcome: Progressing  5/8/2024 1003 by Nba Dunaway RN  Outcome: Progressing     Problem: Safety - Adult  Goal: Free from fall injury  5/8/2024 2046 by Shannon Corrigan RN  Outcome: Progressing  5/8/2024 1003 by Nba Dunaway RN  Outcome: Progressing     Problem: Depression  Goal: Will be euthymic at discharge  Description: INTERVENTIONS:  1. Administer medication as ordered  2. Provide emotional support via 1:1 interaction with staff  3. Encourage involvement in milieu/groups/activities  4. Monitor for social isolation  5/8/2024 2046 by Shannon Corrigan RN  Outcome: Not Progressing  5/8/2024 1003 by Nba Dunaway RN  Outcome: Progressing     Problem: Anxiety  Goal: Will report anxiety at manageable  levels  Description: INTERVENTIONS:  1. Administer medication as ordered  2. Teach and rehearse alternative coping skills  3. Provide emotional support with 1:1 interaction with staff  5/8/2024 2046 by Shannon Corrigan RN  Outcome: Not Progressing  5/8/2024 1003 by Nba Dunaway, RN  Outcome: Progressing  Flowsheets (Taken 5/8/2024 0946)  Will report anxiety at manageable levels: Provide emotional support with 1:1 interaction with staff   Pt isolative to room awake in bed. Pt anxious and irritable on approach. Pt had many questions re her medications. Reports no longer takes BP meds or synthroid. Pt was also educated on seroquel and is willing to take. Pt reports pain on right side of mouth,decreased appetite,poor sleep. Denies SI,HI,A/V hallucinations. Contracts for safety on the unit. Pt states she does not feel comfortable on unit due to a variety of people and issues. Pt feels being on the unit is not helpful and making her feel worse. Pt reports she thought she would have access to a therapist 24/7. Pt feels she is not getting enough one on one time with staff/Dr / and therapists. Reports anxiety and depression 10/10, with 10 being the worst. Medicated with vistaril 50mg po at 2026. Assisted pt with making a phone call. Pt declined to come out of her room, other than to use the phone.

## 2024-05-09 NOTE — PROGRESS NOTES
After patient approached nursing station, this nurse attempted to speak with patient in patient room. The patient told this nurse \"Shannon told me she couldn't discharge me now I don't know what I'm supposed to do, my heart is racing and I just feel a lot of things so I just wanted to be alone.\" This nurse informed her that was true and it seemed she may be anxious and offered a PRN vistaril or the sensory room for music or other forms of relaxation if she liked. Patient declined and rolled over and asked this nurse to leave. This nurse informed the patient if she had further needs to let staff know.

## 2024-05-09 NOTE — PROGRESS NOTES
[facility]  BEHAVIORAL HEALTH FOLLOW-UP NOTE       5/9/2024     Patient was seen and examined in person, Chart reviewed   Patient's case discussed with staff/team    Chief Complaint: Depression    Interim History:     Pt report feeling depressed and anxious and it is worse since being here  Pt has been isolating in her room fearful of her safety with some of the patient getting loud  Pt sleep was disturbed last night  Want to go home and willing to attend IOP and other treatment options  Denies suicidal thoughts  Pain in her jaw and restriction with her food is her main stressor  Feel that at home she would able to make food that is palatable than the pureed food here  Appetite:   [x] Normal/Unchanged  [] Increased  [] Decreased      Sleep:       [] Normal/Unchanged  [x] Fair       [] Poor              Energy:    [] Normal/Unchanged  [] Increased  [x] Decreased        SI [] Present  [x] Absent    HI  []Present  [x] Absent     Aggression:  [] yes  [x] no    Patient is [x] able  [] unable to CONTRACT FOR SAFETY     PAST MEDICAL/PSYCHIATRIC HISTORY:   Past Medical History:   Diagnosis Date    Anxiety     Carcinoid (except of appendix)     Depression     GERD (gastroesophageal reflux disease)     Hypertension        FAMILY/SOCIAL HISTORY:  Family History   Problem Relation Age of Onset    Heart Failure Mother     Heart Failure Father      Social History     Socioeconomic History    Marital status:      Spouse name: Not on file    Number of children: Not on file    Years of education: Not on file    Highest education level: Not on file   Occupational History    Not on file   Tobacco Use    Smoking status: Never    Smokeless tobacco: Never   Substance and Sexual Activity    Alcohol use: No    Drug use: No    Sexual activity: Not on file   Other Topics Concern    Not on file   Social History Narrative    Not on file     Social Determinants of Health     Financial Resource Strain: Not on

## 2024-05-09 NOTE — PROGRESS NOTES
Pt to NS stating,''I can't take it anymore,I can't stay here.'' Informed pt she will need to speak with  this morning. Pt irritable retreated to her room.

## 2024-05-09 NOTE — GROUP NOTE
Date: 5/9/2024    Group Start Time: 0945  Group End Time: 1040  Group Topic: Psychoeducation    [facility] [place] 3W Dipti Bartlett    The Cycle of Anger, The Anger Iceberg, and Types of Anger through a Musical Lens  Patients will introduced to the concept of the anger cycle, types of anger (retaliatory, aggressive, assertive, passive-aggressive, & internalized/passive), and the anger iceberg. Patients will discuss what type of anger they typically respond with, and brainstorm about situations in their lives where they might've benefited from using a different approach. Patients will be asked to listen to specific songs and identify what type of anger they think it might represent. Patients will reflect on the complexity of anger as an emotion and how to manage their anger in a healthy way moving forward. Patients will then engage in MT-guided relaxation techniques with music.     Focus: Coping Skills, Anger Management, Identifying Negative Cycles, Interpersonal Communication Skills    Goals: Increase Community Building/Socialization, Improve Mood, Increase Future Thinking, Improve Coping Skills, Increase Relaxation, Improve Attention to Task, Improve Listening Skills, Able to give feedback to another, Able to reflect/comment on own behavior, Able to receive feedback, Able to self-disclose, Identified feelings, Identified triggers, and Identified distorted thoughts/beliefs    Songs used:  Retaliatory: \"Before He Cheats\" by Lydia Butt  Aggressive: \"[place] Backflip\" by Halle  Passive-Aggressive: \"You're So Vain\" by Josefina Lizarraga  Passive: \"Turn the Page\" by Guido Caldwell  Assertive: \"Closure\" by Renate Hussein     Attendance: Did not attend  Modes of Music Intervention: Receptive Music Listening  Modes of Intervention: Education, Exploration, Clarifying, Problem-solving, and Confrontation    Discipline Responsible: Music Therapist  Signature: Dipti Almaraz, MT-BC, PsychEd Spec

## 2024-05-09 NOTE — GROUP NOTE
Date: 5/9/2024    Group Start Time: 1320  Group End Time: 1420  Group Topic: Music Therapy    [facility] 3W Dipti Bartlett    Emotion Identification Through Classical Music Listening  Patients will learn and discuss the wheel of emotions, and differences between emotion words. Patients will then listen to a series of classical instrumental pieces and identify what emotion the composer is trying to represent. Patients will be provided with an explanation of different musical qualities (tempo, pitch, rhythm, dynamics) that may aid in describing why they chose a specific emotion. Patients will discuss the benefits of emotion identification and mindful music listening.     Pieces used:  \"Brien Tell Overture\" by Luz Orr  \"Lamar de Lune\" by Claude Debussy  \"The Entertainer\" by Juancho Gerber  \"Symphony No.3 in F Major, Op. 90, III. Poco allegretto\" by Lizet Ulrich  \"Adagio for Strings, Op.11\" by Deion Herrera  \"Night on [place]\" by Andrew Alvarenga  \"The Planets: I. Mars, The Bringer of War\" by Robin Barrett  \"Symphony No. 94 in G, 'Surprise': II. Andante\" by Derrek Lopez  \"[place], Op. 20, Act 2: No. 10, Scene. Moderato\" by Jesus Manuel Mckeon    Focus: Coping Skills, Mindfulness Techniques, and Emotion Regulation/Identification  Goals: Improve Mood, Improve Insight/Self-Awareness, Increase Socialization/Community Building, Increase Self-Expression, Improve Attention to Task, Improve Coping Skills/Develop Coping Skills, Improve Emotion Identification/Regulation Skills      Attendance: Did not attend    Discipline Responsible: Music Therapist  Signature: Dipti Almaraz, MT-BC, PsychEd Spec

## 2024-05-09 NOTE — PROGRESS NOTES
After patient approached nursing station with complaints of anxiety, Shannon HOPKINS advised Haldol 5mg. When medicating the patient, patient began tearfully expressing to this nurse that she voluntarily brought herself here, her  was telling everyone that she was here \"making me be out to be the crazy person\" and that \"I'm tired of trying this pill and that pill to see what works.\" This nurse informed the patient that sometimes not every medication works for each patient so sometimes medications are changed at which time patient told this nurse \"you can leave now.\" This nurse educated patient that if further support is needed, it is available at any time. Will continue to monitor.

## 2024-05-09 NOTE — PROGRESS NOTES
Patient c/o mouth pain 9/10.  Patient requesting pain medication and an ensure.  Patient cooperative taking one tab PRN percocet.  Ensure taken to patients bedside.

## 2024-05-10 VITALS
RESPIRATION RATE: 18 BRPM | OXYGEN SATURATION: 94 % | HEIGHT: 64 IN | HEART RATE: 81 BPM | SYSTOLIC BLOOD PRESSURE: 120 MMHG | BODY MASS INDEX: 29.02 KG/M2 | DIASTOLIC BLOOD PRESSURE: 62 MMHG | WEIGHT: 170 LBS | TEMPERATURE: 98.1 F

## 2024-05-10 PROCEDURE — 6370000000 HC RX 637 (ALT 250 FOR IP): Performed by: REGISTERED NURSE

## 2024-05-10 PROCEDURE — 6370000000 HC RX 637 (ALT 250 FOR IP): Performed by: PSYCHIATRY & NEUROLOGY

## 2024-05-10 PROCEDURE — 99239 HOSP IP/OBS DSCHRG MGMT >30: CPT | Performed by: PSYCHIATRY & NEUROLOGY

## 2024-05-10 RX ORDER — QUETIAPINE FUMARATE 25 MG/1
TABLET, FILM COATED ORAL
Qty: 45 TABLET | Refills: 3 | Status: SHIPPED | OUTPATIENT
Start: 2024-05-10

## 2024-05-10 RX ADMIN — LISINOPRIL 5 MG: 5 TABLET ORAL at 08:18

## 2024-05-10 RX ADMIN — OXYCODONE AND ACETAMINOPHEN 1 TABLET: 7.5; 325 TABLET ORAL at 06:41

## 2024-05-10 RX ADMIN — FLUOXETINE HYDROCHLORIDE 40 MG: 20 CAPSULE ORAL at 08:17

## 2024-05-10 RX ADMIN — QUETIAPINE FUMARATE 12.5 MG: 25 TABLET ORAL at 08:18

## 2024-05-10 RX ADMIN — METOPROLOL TARTRATE 100 MG: 50 TABLET, FILM COATED ORAL at 08:18

## 2024-05-10 ASSESSMENT — PAIN SCALES - GENERAL: PAINLEVEL_OUTOF10: 7

## 2024-05-10 NOTE — TRANSITION OF CARE
Behavioral Health Transition Record to Provider    Patient Name: Sil Gonzalez  YOB: 1955   Medical Record Number: [id number]  Date of Admission: 5/7/2024 10:39 AM   Date of Discharge: 5/10/24    Attending Provider: Keaton Goss MD   Discharging Provider: Keaton Goss MD  To contact this individual call [facility] at [phone] or call [facility] at [phone]  and ask the  to page.  If unavailable, ask to be transferred to Behavioral Health Provider on call.  A Behavioral Health Provider will be available on call 24/7 and during holidays.    Primary Care Provider: Brien Dumas MD    Allergies   Allergen Reactions    Dilaudid [Hydromorphone Hcl] Other (See Comments)     Makes her feel funny    Morphine Other (See Comments)     ''makes my head feel funny''    Pentazocine Lactate Other (See Comments)     ''im not sure of this''    Tramadol Other (See Comments)     ''doesnt like how it makes me feel''       Reason for Admission: Patient presented to the ED as a walk in with her  for increased depression and anxiety. She reports only sleeping about 2 hours per night. Pt stated she is having racing thoughts, states everything is just so repetitive. Pt is tearful and anxious while in the bac. She reports some passive suicidal thoughts as if she would be \"happier there.\" She has no plan but has been more frequently thinking about suicide. She also reports spending excessive money lately and feeling kind of bad about it at times. Just recently had all teeth removed and dental implants put in, is paying for her grandson house. Reports beginning of April over using her percocet to get through the day. She was here about 2 weeks ago and given a dose of medication for anxiety and sent home. Today she went to her doctor office and he encouraged her to come to the ED for possible psych admission.     Admission Diagnosis: Anxiety state [F41.1]  Insomnia, unspecified type  mg/dL    Urobilinogen, Urine 0.2 <2.0 E.U./dL    Nitrite, Urine Negative Negative    Leukocyte Esterase, Urine Negative Negative    Urine Reflex to Culture Not Indicated    Urine Drug Screen   Result Value Ref Range    Amphetamine Screen, Urine POSITIVE (A) Negative <1000 ng/mL    Barbiturate Screen, Ur Neg Negative < 200 ng/mL    Benzodiazepine Screen, Urine POSITIVE (A) Negative < 200 ng/mL    Cannabinoid Scrn, Ur Neg Negative < 50 ng/mL    Cocaine Metabolite Screen, Urine Neg Negative < 300 ng/mL    Opiate Scrn, Ur Neg Negative < 300 ng/mL    PCP Screen, Urine Neg Negative < 25 ng/mL    Methadone Screen, Urine Neg Negative <300 ng/mL    Propoxyphene Scrn, Ur Neg Negative <300 ng/mL    Oxycodone Urine POSITIVE (A) Negative <100 ng/mL    FENTANYL SCREEN, URINE Neg Negative < 50 ng/mL    Drug Screen Comment: see below    Comprehensive Metabolic Panel   Result Value Ref Range    Sodium 140 135 - 144 mEq/L    Potassium 3.9 3.4 - 4.9 mEq/L    Chloride 103 95 - 107 mEq/L    CO2 26 20 - 31 mEq/L    Anion Gap 11 9 - 15 mEq/L    Glucose 138 (H) 70 - 99 mg/dL    BUN 9 8 - 23 mg/dL    Creatinine 1.41 (H) 0.50 - 0.90 mg/dL    Est, Glom Filt Rate 39.8 (L) >60    Calcium 8.6 8.5 - 9.9 mg/dL    Total Protein 7.1 6.3 - 8.0 g/dL    Albumin 4.0 3.5 - 4.6 g/dL    Total Bilirubin 0.4 0.2 - 0.7 mg/dL    Alkaline Phosphatase 98 40 - 130 U/L    ALT 10 0 - 33 U/L    AST 19 0 - 35 U/L    Globulin 3.1 2.3 - 3.5 g/dL   Microscopic Urinalysis   Result Value Ref Range    Bacteria, UA Negative Negative /HPF    Hyaline Casts, UA 5-10 0 - 5 /HPF    WBC, UA 6-9 (A) 0 - 5 /HPF    RBC, UA 6-10 (A) 0 - 5 /HPF    Epithelial Cells, UA 10-20 0 - 5 /HPF       Immunizations administered during this encounter:   There is no immunization history on file for this patient.  Influenza Vaccination Status: Documentation of patient's or caregiver's refusal of influenza vaccine.    Screening for Metabolic Disorders for Patients on Antipsychotic Medications  (Data

## 2024-05-10 NOTE — PROGRESS NOTES
Discharge instructions reviewed verbally and in writing including f/u appointments. Patient verbalizes understanding and signed as such. All belongings returned for discharge. Patient provided with food/drug interaction booklet. Patient denies SI, HI, A/V hallucinations, mood is stable.

## 2024-05-10 NOTE — PROGRESS NOTES
Pt declines scheduled synthroid despite education and encouragement. States, \"I havent taken that in years\".

## 2024-05-10 NOTE — DISCHARGE INSTRUCTIONS
Due to the Covid-19 Pandemic, [facility] is not currently available. For assistance with quitting smoking please go to https://smokefree.gov. A prescription for an FDA-approved tobacco cessation medication was offered at discharge and the patient refused.    Someone from [facility] will be calling you tomorrow to follow up on your care. If you don't hear from us, give us a call! [phone].    Keep all follow up appointments, take medications as ordered, utilize positive supports, abstain from use of alcohol and drugs. If symptoms return or you feel at risk to yourself or others, please call 911, return the nearest emergency room, or call your local crisis hotline:  [facility]: [phone]  [facility]: [phone]  [facility]: [phone]    You were offered the flu vaccine on 5/10/24 you declined it.

## 2024-05-10 NOTE — CARE COORDINATION
FAMILY COLLATERAL NOTE    Family/Support Name:  Tomas   Contact #:[phone]   Relationship to Pt:: Spouse    Left message on voicemail to return call for collateral information.    Family/Support contact aware of hospitalization: Yes    Presenting Symptoms/Current Concerns:  Mouth issues due to her dental  Anxiety    Top 3 Life Stressors:   Medical issues regarding her dental work  Patient had 2 abscessed teeth and in pain    Background History Relevant to Current Hospitalization:  Prior hospitalization    Family Mental Health/Substance Use History:   None    Support Network's Goal for Hospitalization:   To come home    Discharge Plan:   Return home    Support Network Supportive of Discharge Plan:   Yes    Support can confirm Safety of Location and Security of Weapons:   Pistol in a locked box.    Support agreeable to Safeguard and Monitor Medications (including Prescription and OTC):   Yes, he will control her medication.    Identified Barriers to Compliance with Discharge Plan:   None    Recommendations for Support Network:     Available for follow up calls    BAR Bills LSW

## 2024-05-10 NOTE — DISCHARGE SUMMARY
DISCHARGE SUMMARY      Patient ID:  Sil Gonzalez  [id number]  69 y.o.  1955      Admit date: 5/7/2024    Discharge date and time: 5/10/2024    Admitting Physician: Keaton Goss MD     Discharge Physician: Dr Wolfgang STARKS    Admission Diagnoses: Anxiety state [F41.1]  Insomnia, unspecified type [G47.00]  Depression, unspecified depression type [F32.A]  Depression, unspecified [F32.A]    Admission Condition: poor    Discharged Condition: stable    Admission Circumstance:     Patient presented to the ED as a walk in with her  for increased depression and anxiety. She reports only sleeping about 2 hours per night. She is having racing thoughts, states everything is just so repetitive. She is tearful and anxious while in the meche. She reports some passive suicidal thoughts as if she would be \"happier there.\" She has no plan but has been more frequently thinking about suicide. She also reports spending excessive money lately and feeling kind of bad about it at times. Just recently had all teeth removed and dental implants put in, is paying for her grandson house. Reports beginning of April over using her percocet to get through the day. She was hear about 2 weeks ago and given a dose of medication for anxiety and sent home. Today she went to her doctor office and he encouraged her to come to the ED for possible psych admission.      HISTORY OF PRESENT ILLNESS:       The patient is a 69 y.o. female  living with , 2 adult children with significant past history of MDD     Pt has been feeling depressed for the past month, getting worse every day.  Stressors:pt had a dental implant done last week and it is painful, still struggling to eat  Son stopped talking to her 15 years ago- no reason why he did that.   Talk to her daughter  Chronic pain - on prescribed opiates  Severity: Rating mood to be around 1/10 (10- good)  Quality:melancholic  Worse in the morning  Content: Hopeless, worthless and  RECONCILIATION AND FOLLOW UP CARE     Signed:  CHRISTINA SHELL MD  5/10/2024  9:34 AM

## 2024-05-10 NOTE — PROGRESS NOTES
Pt left unit with family member for discharge to home. Belongings given to pt.   Pt denies any current suicidal ideation, homicidal ideation or hallucinations.   Mood and affect stable

## 2024-05-10 NOTE — CARE COORDINATION
Met with patient in person. IOP explained. IOP Handouts given. Education given on need to attend IOP on Mondays, Wednesdays, and Fridays from noon to 3 pm and arrive at 1145 am to check-in at registration and that patient may bring a snack, drink, and any medications that may be needed during the IOP time frame. IOP office phone number provided to patient for questions and schedule changes. Patient verbalized understanding.

## 2024-05-10 NOTE — GROUP NOTE
Patient did not attend; anticipated discharge today.    Date: 5/10/2024    Group Start Time: 0940  Group End Time: 1045  Group Topic: Music Therapy    [facility] 3W Dipti Bartlett    Healthy-Unhealthy Music Scale (HUMS) & Iso-Principle Playlist Listening  Patients will complete the HUMS and identify the ways in which they use music as a healthy / unhealthy coping skill. MT will score the assessment and share results with patients. Patients will learn about the \"iso-principle\", or the idea of matching music to your mood in order to gradually change it. Patients will identify how they feel currently and select songs to gradually alter their mood. Patients will select one song from their personal playlist to add to the group playlist. Patients will listen to the group playlist and reflect on how it affected their mood. Patients will discuss the outcomes of the experience and if they could see themselves using it as a coping skill in the future.    Focus: Emotional Awareness/Regulation, Processing, Cognitive Skills, Creativity    Goals: Improve Mood, Improve Insight/Self-Awareness, Improve Self-Expression, Improve Attention to Task, Improve Coping Skills/Develop Coping Skills, Improve Emotion Awareness/Regulation     Documentation completed by Dipti Almaraz, MT-BC, PsychoEd Spec

## 2024-05-10 NOTE — GROUP NOTE
Group Therapy Note    Date: 5/9/2024    Group Start Time: 2000  Group End Time: 2030  Group Topic: Wrap-Up    [facility] 3W Priyanka Huitron, RN        Group Therapy Note    Attendees: 15/18       Patient's Goal:  Get up and out of bed     Notes:  Not accomplished    Status After Intervention:  Unchanged    Participation Level: Active Listener    Participation Quality: Appropriate      Speech:  hesitant      Thought Process/Content: Logical      Affective Functioning: Congruent      Mood: depressed      Level of consciousness:  Alert and Oriented x4      Response to Learning: Able to verbalize current knowledge/experience      Endings: None Reported    Modes of Intervention: Socialization      Discipline Responsible: Registered Nurse      Signature:  Priyanka Randall RN

## 2024-05-10 NOTE — PLAN OF CARE
Patient isolates to her room resting in bed. Flat affect. Blunt. Irritable on approach. Poor concentration. Preoccupied with discharge. Pt rates both her anxiety and depression 10/10. Denies SI/HI and AVH. Pt states she has poor sleep due to having racing thoughts. Decreased appetite due to dental pain. Pt did not attend groups today.   Problem: Self Harm/Suicidality  Goal: Will have no self-injury during hospital stay  Description: INTERVENTIONS:  1.  Ensure constant observer at bedside with Q15M safety checks  2.  Maintain a safe environment  3.  Secure patient belongings  4.  Ensure family/visitors adhere to safety recommendations  5.  Ensure safety tray has been added to patient's diet order  6.  Every shift and PRN: Re-assess suicidal risk via Frequent Screener    5/9/2024 2021 by Lorri Arroyo RN  Outcome: Progressing  5/9/2024 1003 by Acacia Aviles RN  Outcome: Progressing  Flowsheets (Taken 5/9/2024 0900)  Will have no self-injury during hospital stay: Ensure constant observer at bedside with Q15M safety checks     Problem: Pain  Goal: Verbalizes/displays adequate comfort level or baseline comfort level  5/9/2024 2021 by Lorri Arroyo RN  Outcome: Progressing  5/9/2024 1003 by Acacia Aviles RN  Outcome: Progressing     Problem: Risk for Elopement  Goal: Patient will not exit the unit/facility without proper excort  5/9/2024 2021 by Lorri Arroyo RN  Outcome: Progressing  5/9/2024 1003 by Acacia Aviles RN  Outcome: Progressing  Flowsheets (Taken 5/9/2024 0900)  Nursing Interventions for Elopement Risk:   Reduce environmental triggers   Make sure patient has all necessary personal care items     Problem: Depression  Goal: Will be euthymic at discharge  Description: INTERVENTIONS:  1. Administer medication as ordered  2. Provide emotional support via 1:1 interaction with staff  3. Encourage involvement in milieu/groups/activities  4. Monitor for social isolation  5/9/2024

## 2024-05-10 NOTE — DISCHARGE INSTR - DIET
Good nutrition is important when healing from an illness, injury, or surgery.  Follow any nutrition recommendations given to you during your hospital stay.   If you were given an oral nutrition supplement while in the hospital, continue to take this supplement at home.  You can take it with meals, in-between meals, and/or before bedtime. These supplements can be purchased at most local grocery stores, pharmacies, and chain [employer]stores.   If you have any questions about your diet or nutrition, call the hospital and ask for the dietitian.  As tolerated

## 2024-05-13 ENCOUNTER — HOSPITAL ENCOUNTER (OUTPATIENT)
Dept: PSYCHIATRY | Age: 69
Setting detail: THERAPIES SERIES
Discharge: HOME | End: 2024-05-13
Payer: MEDICARE

## 2024-05-13 DIAGNOSIS — F41.1 GAD (GENERALIZED ANXIETY DISORDER): Primary | ICD-10-CM

## 2024-05-13 PROCEDURE — 90853 GROUP PSYCHOTHERAPY: CPT

## 2024-05-13 RX ORDER — ALPRAZOLAM 0.25 MG/1
0.25 TABLET ORAL 2 TIMES DAILY PRN
Qty: 30 TABLET | Refills: 0 | Status: SHIPPED | OUTPATIENT
Start: 2024-05-13 | End: 2024-05-28

## 2024-05-13 NOTE — PROGRESS NOTES
Intensive Outpatient Program Behavioral Health Psychotherapy Note        Diagnosis: Anxiety, Depression       Psychotherapy Session    Start time: 1200   Stop time: 1500    Problem number: 01   Short term goal (STP):   Get well   Patient Mental Status and Mood/Affect: Depressed, tearful, anxious, shaking      Patient Behavior and Appearance: Neatly Groomed and Attentive    Intervention/Techniques: Informed, Validated/Supported, Refocused, Assigned, Reflected, Guided, Challenged, Reframed, Modeled/Rehearsed, Prompted/Cued, Listened/Empathized, Observed/Monitored, Queried/Probed, and Promoted Peer Support      On a scale from 1-10 (1 being no concerns through 10 being severe concerns) the patient listed her:      Depression:  10   Anger: 3   Anxiety: 10       Focus of Session/Patient Response and Progress Towards Goal: Patient is a 69 year old female that attended her scheduled IOP Group. Patient reports she was released from [facility] on Friday. Patient reports not sleeping, eating and anxiety extremely bad. LSW noticed patient shaking, having a hard time concentrating and reached out to Dr. Goss for further assessment. Continuing with the S.E.L.F curriculum, patients learned to identify and articulate their emotions in various situations, understand the connection between emotions and personal values, and develop strategies to effectively manage and respond to emotions. LSW noted that the patient was able to identify a situation where recognizing and expressing emotions could have led to a better outcome. LSW noted that patient presented as engaged and supportive of her fellow group members and her engagement and support was well-received by others.

## 2024-05-14 NOTE — PROGRESS NOTES
Treatment Team Meeting    in attendance:  Christine Patino; Amanda Sharp      Diagnosis: Depression and OCD      Reviewed:   Care plan, including goal: To decrease depression and anxiety symptoms.      Individualized needs:  Application of coping and other therapy skills in real life.      Progress:  TBD- Patient started IOP on 5/14 with extreme anxiety. Patient was visibly shaking and tearful during IOP needing constant support and redirection.      Next clinician appt: 5/15/24      Outside providers:   Brien Dumas      [facility]   Attendance:  1/1   Started: 5/13/24   Anticipated Discharge Date: 8/1/2024

## 2024-05-15 ENCOUNTER — HOSPITAL ENCOUNTER (OUTPATIENT)
Dept: PSYCHIATRY | Age: 69
Setting detail: THERAPIES SERIES
Discharge: HOME | End: 2024-05-15
Payer: MEDICARE

## 2024-05-15 DIAGNOSIS — F42.9 OBSESSIVE-COMPULSIVE DISORDER, UNSPECIFIED TYPE: ICD-10-CM

## 2024-05-15 DIAGNOSIS — F41.1 GAD (GENERALIZED ANXIETY DISORDER): Primary | ICD-10-CM

## 2024-05-15 DIAGNOSIS — F33.2 SEVERE EPISODE OF RECURRENT MAJOR DEPRESSIVE DISORDER, WITHOUT PSYCHOTIC FEATURES (HCC): ICD-10-CM

## 2024-05-15 PROCEDURE — 90853 GROUP PSYCHOTHERAPY: CPT

## 2024-05-15 PROCEDURE — 99214 OFFICE O/P EST MOD 30 MIN: CPT | Performed by: REGISTERED NURSE

## 2024-05-15 RX ORDER — FLUOXETINE HYDROCHLORIDE 20 MG/1
60 CAPSULE ORAL DAILY
Qty: 90 CAPSULE | Refills: 2 | Status: SHIPPED | OUTPATIENT
Start: 2024-05-15

## 2024-05-15 RX ORDER — HYDROXYZINE PAMOATE 25 MG/1
25 CAPSULE ORAL 3 TIMES DAILY PRN
Qty: 90 CAPSULE | Refills: 2 | Status: SHIPPED | OUTPATIENT
Start: 2024-05-15 | End: 2024-08-13

## 2024-05-15 RX ORDER — QUETIAPINE FUMARATE 25 MG/1
TABLET, FILM COATED ORAL
Qty: 45 TABLET | Refills: 3 | Status: SHIPPED | OUTPATIENT
Start: 2024-05-15

## 2024-05-15 NOTE — PROGRESS NOTES
Intensive Outpatient Program Behavioral Health Psychotherapy Note        Diagnosis: Anxiety, Depression       Psychotherapy Session    Start time: 1200   Stop time: 1500    Problem number: 01   Short term goal (STP):   Get well   Patient Mental Status and Mood/Affect: Depressed, tearful, anxious, shaking      Patient Behavior and Appearance: Neatly Groomed and Attentive    Intervention/Techniques: Informed, Validated/Supported, Refocused, Assigned, Reflected, Guided, Challenged, Reframed, Modeled/Rehearsed, Prompted/Cued, Listened/Empathized, Observed/Monitored, Queried/Probed, and Promoted Peer Support      On a scale from 1-10 (1 being no concerns through 10 being severe concerns) the patient listed her:      Depression:  10   Anger: 5   Anxiety: 10       Focus of Session/Patient Response and Progress Towards Goal: Patient is a 69 year old female that attended her scheduled IOP Group. [facility]  came in and spoke with patients about resources that are available to them. Patients shared with one another coping strategies and situations they were currently struggling with one another. LSW noted that patient presented as engaged and supportive of his fellow group members and his engagement and support was well-received by others.

## 2024-05-15 NOTE — PROGRESS NOTES
unspecified type          Plan:    Start/Continue     Increase Prozac 60mg QAM  Continue Vistrail 25mg TID PRN- first line for anxiety  Continue Seroquel 12.5mg Qam and afternoon; 50mg QHS  Continue Xanax 0.25mg BID PRN- second line for anxiety    No Labs ordered today   Crisis plan reviewed and patient verbally contracts for safety.  Go to ED with emergent symptoms or safety concerns.  Risks, benefits, side effects of medications, including any / all black box warnings, discussed with patient, who verbalizes their understanding.     Patient denies any thoughts of harm to self and denies any acute safety concerns remains appropriate for outpatient level of care. Will continue to reassess with each appointment.     Reviewed current Medications with the patient. Education provided on the compliance with treatment.   Reviewed OARRs, possibly taking in excess, running out a few months too early     The anticipated benefits and side effects of the medications, including the anticipated results of not receiving the medication, and of alternatives to the medications were explained to the patient and their informed consent was obtained for starting medications as well as adjusting the doses (titration or tapering) as indicated. The above information was given by physician in verbal form and sufficient understanding was in evidence. The patient participated in discussion of the information and question and/or concerns were addressed before the medication was given.         PSYCHOTHERAPY/COUNSELING:  Encourage patient to attend outpatient appointments and therapy.    [x] Therapeutic interview  [] Supportive  [x] CBT  [x] Ongoing  [] Other    Continue IOP 8-12 weeks     No follow-ups on file. 2 weeks    Please note this report has been partially produced using speech recognition software  And may cause contain errors related to that system including grammar, punctuation and spelling as well as words and phrases that may seem

## 2024-05-16 ENCOUNTER — HOSPITAL ENCOUNTER (OUTPATIENT)
Dept: PSYCHIATRY | Age: 69
Setting detail: THERAPIES SERIES
Discharge: HOME | End: 2024-05-16
Payer: MEDICARE

## 2024-05-16 PROCEDURE — 90853 GROUP PSYCHOTHERAPY: CPT

## 2024-05-16 NOTE — PROGRESS NOTES
Intensive Outpatient Program Behavioral Health Psychotherapy Note        Diagnosis: Anxiety, Depression       Psychotherapy Session    Start time: 1200   Stop time: 1500    Problem number: 01   Short term goal (STP):   Get well   Patient Mental Status and Mood/Affect: Depressed, tearful, anxious, shaking      Patient Behavior and Appearance: Neatly Groomed and Attentive    Intervention/Techniques: Informed, Validated/Supported, Refocused, Assigned, Reflected, Guided, Challenged, Reframed, Modeled/Rehearsed, Prompted/Cued, Listened/Empathized, Observed/Monitored, Queried/Probed, and Promoted Peer Support      On a scale from 1-10 (1 being no concerns through 10 being severe concerns) the patient listed her:      Depression:  10   Anger: 4   Anxiety: 10       Focus of Session/Patient Response and Progress Towards Goal: Patient is a 69 year old female that attended her scheduled IOP Group. Continuing with the S.E.L.F curriculum, patients learned to develop skills in matching emotional reactions to the realities of a situation and provide tools for thinking before their emotional responses precipitate action. LSW noted that the patient was able to identify patterns in her emotional responses that she would like to change or improve upon. LSW noted that patient presented as engaged and supportive of her fellow group members and her engagement and support was well-received by others.

## 2024-05-20 ENCOUNTER — HOSPITAL ENCOUNTER (OUTPATIENT)
Dept: PSYCHIATRY | Age: 69
Setting detail: THERAPIES SERIES
End: 2024-05-20
Payer: MEDICARE

## 2024-05-20 ENCOUNTER — HOSPITAL ENCOUNTER (EMERGENCY)
Age: 69
Discharge: PSYCHIATRIC OR SUBSTANCE USE FACILITY | End: 2024-05-20
Attending: EMERGENCY MEDICINE
Payer: MEDICARE

## 2024-05-20 VITALS
DIASTOLIC BLOOD PRESSURE: 83 MMHG | SYSTOLIC BLOOD PRESSURE: 141 MMHG | RESPIRATION RATE: 16 BRPM | OXYGEN SATURATION: 97 % | TEMPERATURE: 98.4 F | WEIGHT: 170 LBS | HEIGHT: 64 IN | BODY MASS INDEX: 29.02 KG/M2 | HEART RATE: 84 BPM

## 2024-05-20 DIAGNOSIS — F32.9 MAJOR DEPRESSIVE DISORDER WITH CURRENT ACTIVE EPISODE, UNSPECIFIED DEPRESSION EPISODE SEVERITY, UNSPECIFIED WHETHER RECURRENT: ICD-10-CM

## 2024-05-20 DIAGNOSIS — F41.1 GENERALIZED ANXIETY DISORDER: Primary | ICD-10-CM

## 2024-05-20 LAB
ALBUMIN SERPL-MCNC: 4.5 G/DL (ref 3.5–4.6)
ALP SERPL-CCNC: 109 U/L (ref 40–130)
ALT SERPL-CCNC: 15 U/L (ref 0–33)
AMPHET UR QL SCN: ABNORMAL
ANION GAP SERPL CALCULATED.3IONS-SCNC: 12 MEQ/L (ref 9–15)
APAP SERPL-MCNC: 10 UG/ML (ref 10–30)
APAP SERPL-MCNC: <5 UG/ML (ref 10–30)
AST SERPL-CCNC: 20 U/L (ref 0–35)
BARBITURATES UR QL SCN: ABNORMAL
BASOPHILS # BLD: 0 K/UL (ref 0–0.2)
BASOPHILS NFR BLD: 0.5 %
BENZODIAZ UR QL SCN: POSITIVE
BILIRUB SERPL-MCNC: 0.5 MG/DL (ref 0.2–0.7)
BILIRUB UR QL STRIP: NEGATIVE
BUN SERPL-MCNC: 12 MG/DL (ref 8–23)
CALCIUM SERPL-MCNC: 9.2 MG/DL (ref 8.5–9.9)
CANNABINOIDS UR QL SCN: ABNORMAL
CHLORIDE SERPL-SCNC: 104 MEQ/L (ref 95–107)
CHOLEST SERPL-MCNC: 171 MG/DL (ref 0–199)
CK SERPL-CCNC: 47 U/L (ref 0–170)
CLARITY UR: CLEAR
CO2 SERPL-SCNC: 26 MEQ/L (ref 20–31)
COCAINE UR QL SCN: ABNORMAL
COLOR UR: YELLOW
CREAT SERPL-MCNC: 1.26 MG/DL (ref 0.5–0.9)
DRUG SCREEN COMMENT UR-IMP: ABNORMAL
EOSINOPHIL # BLD: 0.1 K/UL (ref 0–0.7)
EOSINOPHIL NFR BLD: 0.7 %
ERYTHROCYTE [DISTWIDTH] IN BLOOD BY AUTOMATED COUNT: 13 % (ref 11.5–14.5)
ETHANOL PERCENT: NORMAL G/DL
ETHANOLAMINE SERPL-MCNC: <10 MG/DL (ref 0–0.08)
FENTANYL SCREEN, URINE: ABNORMAL
GLOBULIN SER CALC-MCNC: 3.1 G/DL (ref 2.3–3.5)
GLUCOSE SERPL-MCNC: 121 MG/DL (ref 70–99)
GLUCOSE UR STRIP-MCNC: NEGATIVE MG/DL
HCT VFR BLD AUTO: 40.4 % (ref 37–47)
HDLC SERPL-MCNC: 54 MG/DL (ref 40–59)
HGB BLD-MCNC: 13.7 G/DL (ref 12–16)
HGB UR QL STRIP: NEGATIVE
KETONES UR STRIP-MCNC: ABNORMAL MG/DL
LDLC SERPL CALC-MCNC: 95 MG/DL (ref 0–129)
LEUKOCYTE ESTERASE UR QL STRIP: NEGATIVE
LYMPHOCYTES # BLD: 1.7 K/UL (ref 1–4.8)
LYMPHOCYTES NFR BLD: 22.3 %
MCH RBC QN AUTO: 31.6 PG (ref 27–31.3)
MCHC RBC AUTO-ENTMCNC: 33.9 % (ref 33–37)
MCV RBC AUTO: 93.1 FL (ref 79.4–94.8)
METHADONE UR QL SCN: ABNORMAL
MONOCYTES # BLD: 0.4 K/UL (ref 0.2–0.8)
MONOCYTES NFR BLD: 5 %
NEUTROPHILS # BLD: 5.5 K/UL (ref 1.4–6.5)
NEUTS SEG NFR BLD: 71.2 %
NITRITE UR QL STRIP: NEGATIVE
OPIATES UR QL SCN: ABNORMAL
OXYCODONE UR QL SCN: POSITIVE
PCP UR QL SCN: ABNORMAL
PH UR STRIP: 5.5 [PH] (ref 5–9)
PLATELET # BLD AUTO: 287 K/UL (ref 130–400)
POTASSIUM SERPL-SCNC: 4.2 MEQ/L (ref 3.4–4.9)
PROPOXYPH UR QL SCN: ABNORMAL
PROT SERPL-MCNC: 7.6 G/DL (ref 6.3–8)
PROT UR STRIP-MCNC: NEGATIVE MG/DL
RBC # BLD AUTO: 4.34 M/UL (ref 4.2–5.4)
SALICYLATES SERPL-MCNC: <0.3 MG/DL (ref 15–30)
SODIUM SERPL-SCNC: 142 MEQ/L (ref 135–144)
SP GR UR STRIP: 1.02 (ref 1–1.03)
TRIGL SERPL-MCNC: 110 MG/DL (ref 0–150)
TSH SERPL-MCNC: 0.69 UIU/ML (ref 0.44–3.86)
URINE REFLEX TO CULTURE: ABNORMAL
UROBILINOGEN UR STRIP-ACNC: 0.2 E.U./DL
WBC # BLD AUTO: 7.6 K/UL (ref 4.8–10.8)

## 2024-05-20 PROCEDURE — 85025 COMPLETE CBC W/AUTO DIFF WBC: CPT

## 2024-05-20 PROCEDURE — 80061 LIPID PANEL: CPT

## 2024-05-20 PROCEDURE — 99285 EMERGENCY DEPT VISIT HI MDM: CPT

## 2024-05-20 PROCEDURE — 81003 URINALYSIS AUTO W/O SCOPE: CPT

## 2024-05-20 PROCEDURE — 83036 HEMOGLOBIN GLYCOSYLATED A1C: CPT

## 2024-05-20 PROCEDURE — 80179 DRUG ASSAY SALICYLATE: CPT

## 2024-05-20 PROCEDURE — 80307 DRUG TEST PRSMV CHEM ANLYZR: CPT

## 2024-05-20 PROCEDURE — 82077 ASSAY SPEC XCP UR&BREATH IA: CPT

## 2024-05-20 PROCEDURE — 84443 ASSAY THYROID STIM HORMONE: CPT

## 2024-05-20 PROCEDURE — 82550 ASSAY OF CK (CPK): CPT

## 2024-05-20 PROCEDURE — 80143 DRUG ASSAY ACETAMINOPHEN: CPT

## 2024-05-20 PROCEDURE — 6370000000 HC RX 637 (ALT 250 FOR IP): Performed by: PHYSICIAN ASSISTANT

## 2024-05-20 PROCEDURE — 36415 COLL VENOUS BLD VENIPUNCTURE: CPT

## 2024-05-20 PROCEDURE — 80053 COMPREHEN METABOLIC PANEL: CPT

## 2024-05-20 RX ORDER — QUETIAPINE FUMARATE 25 MG/1
50 TABLET, FILM COATED ORAL ONCE
Status: COMPLETED | OUTPATIENT
Start: 2024-05-20 | End: 2024-05-20

## 2024-05-20 RX ORDER — HYDROXYZINE PAMOATE 25 MG/1
25 CAPSULE ORAL ONCE
Status: COMPLETED | OUTPATIENT
Start: 2024-05-20 | End: 2024-05-20

## 2024-05-20 RX ADMIN — HYDROXYZINE PAMOATE 25 MG: 25 CAPSULE ORAL at 19:26

## 2024-05-20 RX ADMIN — QUETIAPINE FUMARATE 50 MG: 25 TABLET ORAL at 19:26

## 2024-05-20 ASSESSMENT — PAIN - FUNCTIONAL ASSESSMENT: PAIN_FUNCTIONAL_ASSESSMENT: NONE - DENIES PAIN

## 2024-05-20 NOTE — ED NOTES
Patient to [facility], changed into psych safe clothing. Belongings secured and placed into locker 5. No contraband found. Skin Intact. Urine specimen provided at this time and sent to lab.

## 2024-05-20 NOTE — ED PROVIDER NOTES
time of this note:    No orders to display       LABS:  Labs Reviewed   CBC WITH AUTO DIFFERENTIAL - Abnormal; Notable for the following components:       Result Value    MCH 31.6 (*)     All other components within normal limits   COMPREHENSIVE METABOLIC PANEL - Abnormal; Notable for the following components:    Glucose 121 (*)     Creatinine 1.26 (*)     Est, Glom Filt Rate 45.6 (*)     All other components within normal limits   SALICYLATE LEVEL - Abnormal; Notable for the following components:    Salicylate, Serum <0.3 (*)     All other components within normal limits   URINE DRUG SCREEN - Abnormal; Notable for the following components:    Benzodiazepine Screen, Urine POSITIVE (*)     Oxycodone Urine POSITIVE (*)     All other components within normal limits   URINALYSIS WITH REFLEX TO CULTURE - Abnormal; Notable for the following components:    Ketones, Urine TRACE (*)     All other components within normal limits   ACETAMINOPHEN LEVEL - Abnormal; Notable for the following components:    Acetaminophen Level <5 (*)     All other components within normal limits   ACETAMINOPHEN LEVEL   CK   ETHANOL   LIPID PANEL   TSH   HEMOGLOBIN A1C       All other labs were within normal range or not returned as of this dictation.    EMERGENCY DEPARTMENT COURSE and DIFFERENTIAL DIAGNOSIS/MDM:   Vitals:    Vitals:    05/20/24 1056   BP: (!) 141/83   Pulse: 84   Resp: 16   Temp: 98.4 °F (36.9 °C)   TempSrc: Oral   SpO2: 97%   Weight: 77.1 kg (170 lb)   Height: 1.626 m (5' 4\")       MDM  Number of Diagnoses or Management Options  Generalized anxiety disorder  Major depressive disorder with current active episode, unspecified depression episode severity, unspecified whether recurrent  Diagnosis management comments: Medically cleared and will be transferred to OSH for anxiety/ depression.        Procedures    CRITICAL CARE TIME   Total Critical Care time was 0 minutes, excluding separately reportable procedures.  There was a high

## 2024-05-20 NOTE — ED NOTES
Reviewed patient with Dr. Goss. Orders received to transfer out for [facility] once medically cleared.

## 2024-05-21 LAB
ESTIMATED AVERAGE GLUCOSE: 103 MG/DL
HBA1C MFR BLD: 5.2 % (ref 4–6)

## 2024-05-21 NOTE — ED NOTES
Patient ambulated to cot without issue, patient is calm and cooperative all belongings sent with patient.

## 2024-05-21 NOTE — ED NOTES
Patient appears to be  sleeping. Respirations are even and unlabored. No distress noted at this time.

## 2024-05-21 NOTE — PROGRESS NOTES
Patient entered facility in police custody hyperventilating. When paramedic went to give nurse report about patients demeanor on scene. Patient stopped and began to yell and curse at staff. Patient continued to hyperventilate again. Patient then stopped hyperventilating and began crying.  Police at bedside along with security as patient continues to cry.   [facility] here to transport pt to [facility].

## 2024-05-22 ENCOUNTER — HOSPITAL ENCOUNTER (OUTPATIENT)
Dept: PSYCHIATRY | Age: 69
Setting detail: THERAPIES SERIES
End: 2024-05-22
Payer: MEDICARE

## 2024-05-23 ENCOUNTER — APPOINTMENT (OUTPATIENT)
Dept: PSYCHIATRY | Age: 69
End: 2024-05-23
Payer: MEDICARE

## 2024-05-29 ENCOUNTER — APPOINTMENT (OUTPATIENT)
Dept: PSYCHIATRY | Age: 69
End: 2024-05-29
Payer: MEDICARE

## 2024-05-30 ENCOUNTER — APPOINTMENT (OUTPATIENT)
Dept: PSYCHIATRY | Age: 69
End: 2024-05-30
Payer: MEDICARE

## 2024-06-03 ENCOUNTER — APPOINTMENT (OUTPATIENT)
Dept: PSYCHIATRY | Age: 69
End: 2024-06-03
Payer: MEDICARE

## 2024-06-05 ENCOUNTER — HOSPITAL ENCOUNTER (OUTPATIENT)
Dept: PSYCHIATRY | Age: 69
Setting detail: THERAPIES SERIES
Discharge: HOME | End: 2024-06-05
Payer: MEDICARE

## 2024-06-05 ENCOUNTER — APPOINTMENT (OUTPATIENT)
Dept: PSYCHIATRY | Age: 69
End: 2024-06-05
Payer: MEDICARE

## 2024-06-05 DIAGNOSIS — F33.2 SEVERE EPISODE OF RECURRENT MAJOR DEPRESSIVE DISORDER, WITHOUT PSYCHOTIC FEATURES (HCC): ICD-10-CM

## 2024-06-05 DIAGNOSIS — F41.1 GAD (GENERALIZED ANXIETY DISORDER): ICD-10-CM

## 2024-06-05 DIAGNOSIS — F42.9 OBSESSIVE-COMPULSIVE DISORDER, UNSPECIFIED TYPE: Primary | ICD-10-CM

## 2024-06-05 PROCEDURE — 90853 GROUP PSYCHOTHERAPY: CPT

## 2024-06-05 PROCEDURE — 99214 OFFICE O/P EST MOD 30 MIN: CPT | Performed by: REGISTERED NURSE

## 2024-06-05 RX ORDER — HYDROXYZINE PAMOATE 25 MG/1
25 CAPSULE ORAL 3 TIMES DAILY PRN
Qty: 90 CAPSULE | Refills: 2 | Status: SHIPPED | OUTPATIENT
Start: 2024-06-05 | End: 2024-09-03

## 2024-06-05 RX ORDER — QUETIAPINE FUMARATE 25 MG/1
TABLET, FILM COATED ORAL
Qty: 45 TABLET | Refills: 3 | Status: SHIPPED | OUTPATIENT
Start: 2024-06-05

## 2024-06-05 RX ORDER — FLUOXETINE HYDROCHLORIDE 20 MG/1
60 CAPSULE ORAL DAILY
Qty: 90 CAPSULE | Refills: 2 | Status: SHIPPED | OUTPATIENT
Start: 2024-06-05

## 2024-06-05 NOTE — PROGRESS NOTES
Reviewed current Medications with the patient. Education provided on the compliance with treatment.   Reviewed OARRs, possibly taking in excess, running out a few months too early     The anticipated benefits and side effects of the medications, including the anticipated results of not receiving the medication, and of alternatives to the medications were explained to the patient and their informed consent was obtained for starting medications as well as adjusting the doses (titration or tapering) as indicated. The above information was given by physician in verbal form and sufficient understanding was in evidence. The patient participated in discussion of the information and question and/or concerns were addressed before the medication was given.         PSYCHOTHERAPY/COUNSELING:  Encourage patient to attend outpatient appointments and therapy.    [x] Therapeutic interview  [] Supportive  [x] CBT  [x] Ongoing  [] Other    Continue IOP 8-12 weeks     No follow-ups on file. 4 weeks    Please note this report has been partially produced using speech recognition software  And may cause contain errors related to that system including grammar, punctuation and spelling as well as words and phrases that may seem inappropriate. If there are questions or concerns please feel free to contact me to clarify.    JAMILA Lnida CNP  Electronically signed by JAMILA Linda CNP on 6/5/2024 at 1:23 PM  6/5/2024 1:23 PM    Psychiatry

## 2024-06-05 NOTE — PROGRESS NOTES
Intensive Outpatient Program Behavioral Health Psychotherapy Note        Diagnosis: Anxiety, Depression       Psychotherapy Session    Start time: 1200   Stop time: 1500    Problem number: 01   Short term goal (STP):   to cure depression   Patient Mental Status and Mood/Affect: Depressed, anxious, shaking, blunt, flat      Patient Behavior and Appearance: Neatly Groomed and Attentive    Intervention/Techniques: Informed, Validated/Supported, Refocused, Assigned, Reflected, Guided, Challenged, Reframed, Modeled/Rehearsed, Prompted/Cued, Listened/Empathized, Observed/Monitored, Queried/Probed, and Promoted Peer Support      On a scale from 1-10 (1 being no concerns through 10 being severe concerns) the patient listed her:      Depression:  10   Anger:1   Anxiety: 1       Focus of Session/Patient Response and Progress Towards Goal: Patient is a 69 year old female that attended her scheduled IOP Group. Patient presented irritable at times, reports continue to feel overwhelmed, anxious, depressed, fatigued, overstimulated, racing thoughts. Patient was provided with a handout of various coping mechanisms.  Continuing with the S.E.L.F curriculum, patients gained an understanding of addictive and compulsive behaviors and how these behaviors can lead to a vicious cycle of dependency, worsening mental health, strained relationships, financial problems, and physical health issues.  Patients participated in an ACE Survey and reflected on their scores. LSW noted that patient presented as engaged and supportive of his/ her fellow group members and his/ her engagement and support was well-received by others.   Patient denies any thoughts of harm to self and denies any acute safety concerns remains appropriate for outpatient level of care.

## 2024-06-06 ENCOUNTER — HOSPITAL ENCOUNTER (OUTPATIENT)
Dept: PSYCHIATRY | Age: 69
Setting detail: THERAPIES SERIES
Discharge: HOME | End: 2024-06-06
Payer: MEDICARE

## 2024-06-06 ENCOUNTER — APPOINTMENT (OUTPATIENT)
Dept: PSYCHIATRY | Age: 69
End: 2024-06-06
Payer: MEDICARE

## 2024-06-06 PROCEDURE — 90853 GROUP PSYCHOTHERAPY: CPT

## 2024-06-06 NOTE — PROGRESS NOTES
Intensive Outpatient Program Behavioral Health Psychotherapy Note        Diagnosis: Anxiety, Depression       Psychotherapy Session    Start time: 1200   Stop time: 1500    Problem number: 01   Short term goal (STP):   to cure depression   Patient Mental Status and Mood/Affect: Depressed, anxious, shaking, blunt, flat      Patient Behavior and Appearance: Neatly Groomed and Attentive    Intervention/Techniques: Informed, Validated/Supported, Refocused, Assigned, Reflected, Guided, Challenged, Reframed, Modeled/Rehearsed, Prompted/Cued, Listened/Empathized, Observed/Monitored, Queried/Probed, and Promoted Peer Support      On a scale from 1-10 (1 being no concerns through 10 being severe concerns) the patient listed her:      Depression:  1  (Patient marked one; however during discussion reports depression is a 10)   Anger:1   Anxiety: 1      Patient reports her biggest stressor is herself.       Focus of Session/Patient Response and Progress Towards Goal: Patient is a 69 year old female that attended her scheduled IOP Group. Continuing with the S.E.L.F curriculum, patients learned that conflict resolution is crucial because unresolved conflicts can lead to strained relationships, increased tension, and negative emotions. In personal relationships, unresolved conflicts can erode trust and intimacy. In professional settings, conflicts can hinder teamwork, productivity, and overall organizational success. Effective conflict resolution promotes understanding, collaboration, and the development of win-win solutions, fostering healthier and more productive relationships. LSW noted that the patient was able to identify possible emotional barriers that impact his/ her ability to resolve conflicts effectively. LSW noted that patient presented as engaged and supportive of his/ her fellow group members and his/ her engagement and support was well-received by others.  Patient denies any thoughts of harm to self and denies any

## 2024-06-10 ENCOUNTER — APPOINTMENT (OUTPATIENT)
Dept: PSYCHIATRY | Age: 69
End: 2024-06-10
Payer: MEDICARE

## 2024-06-10 ENCOUNTER — HOSPITAL ENCOUNTER (OUTPATIENT)
Dept: PSYCHIATRY | Age: 69
Setting detail: THERAPIES SERIES
Discharge: HOME | End: 2024-06-10
Payer: MEDICARE

## 2024-06-10 PROCEDURE — 90853 GROUP PSYCHOTHERAPY: CPT

## 2024-06-10 NOTE — PROGRESS NOTES
Intensive Outpatient Program Behavioral Health Psychotherapy Note        Diagnosis: Anxiety, Depression       Psychotherapy Session    Start time: 1200   Stop time: 1500    Problem number: 01   Short term goal (STP):   to alleviate depression   Patient Mental Status and Mood/Affect: Depressed, anxious, shaking, blunt, flat      Patient Behavior and Appearance: Neatly Groomed and Attentive    Intervention/Techniques: Informed, Validated/Supported, Refocused, Assigned, Reflected, Guided, Challenged, Reframed, Modeled/Rehearsed, Prompted/Cued, Listened/Empathized, Observed/Monitored, Queried/Probed, and Promoted Peer Support      On a scale from 1-10 (1 being no concerns through 10 being severe concerns) the patient listed her:      Depression:  10   Anger:1   Anxiety: 1      Patient reports her biggest stressor is herself. Patient does not feel her medication is working as her depression level is a 10. Patient advised that she has a [facility] appointment on Wednesday and that they will be doing her medication management.       Focus of Session/Patient Response and Progress Towards Goal: Patient is a 69 year old female that attended her scheduled IOP Group. Continuing with the S.E.L.F curriculum, patients gained an understanding in managing the re-experiencing phenomena associated with post-traumatic stress. Patients explored flashbacks, post-traumatic nightmares, body memories, and behavioral reenactments. Patients were provided with strategies for both individuals experiencing these phenomena and those supporting them.  LSW noted that patient was able to identify his/ her own triggers. LSW noted that patient presented as engaged and supportive of his/ her fellow group members and his/ her engagement and support was well-received by others.  Patient denies any thoughts of harm to self and denies any acute safety concerns remains appropriate for outpatient level of care. Patient asked appropriate questions. Patient was

## 2024-06-12 ENCOUNTER — APPOINTMENT (OUTPATIENT)
Dept: PSYCHIATRY | Age: 69
End: 2024-06-12
Payer: MEDICARE

## 2024-06-13 ENCOUNTER — HOSPITAL ENCOUNTER (OUTPATIENT)
Dept: PSYCHIATRY | Age: 69
Setting detail: THERAPIES SERIES
Discharge: HOME | End: 2024-06-13
Payer: MEDICARE

## 2024-06-13 PROCEDURE — 90853 GROUP PSYCHOTHERAPY: CPT

## 2024-06-13 NOTE — PROGRESS NOTES
Intensive Outpatient Program Behavioral Health Psychotherapy Note        Diagnosis: Anxiety, Depression       Psychotherapy Session    Start time: 1200   Stop time: 1500    Problem number: 01   Short term goal (STP):   to alleviate depression   Patient Mental Status and Mood/Affect: Depressed, anxious, shaking, blunt, flat      Patient Behavior and Appearance: Neatly Groomed and Attentive    Intervention/Techniques: Informed, Validated/Supported, Refocused, Assigned, Reflected, Guided, Challenged, Reframed, Modeled/Rehearsed, Prompted/Cued, Listened/Empathized, Observed/Monitored, Queried/Probed, and Promoted Peer Support      On a scale from 1-10 (1 being no concerns through 10 being severe concerns) the patient listed her:      Depression:  10   Anger:1   Anxiety: 1      Patient reports her biggest stressor is herself.        Focus of Session/Patient Response and Progress Towards Goal: Patient is a 69 year old female that attended her scheduled IOP Group. Continuing with the S.E.L.F curriculum, patients learned that Loss is an inevitable part of life, and it can be a challenging experience to navigate. Patients explored how the components of Safety, Emotions, Loss, and Future (S.E.L.F.) can provide a guide for individuals to work through loss and move towards a new future. Patients also discussed the barriers that can hinder the process of working through loss. LSW noted that patient was able to identify the kinds of emotion loss evokes in him/ her and the kinds of emotions that just talking about loss evoke. LSW noted that patient presented as engaged and supportive of his/ her fellow group members and his/ her engagement and support was well-received by others.  Patient denies any thoughts of harm to self and denies any acute safety concerns remains appropriate for outpatient level of care. Patient asked appropriate questions. Patient was engaged in the session. Patient participated fully in the session. Patient

## 2024-06-14 NOTE — PLAN OF CARE
Treatment Team Meeting in attendance:  Christine Patino; Tameka Thornton      Diagnosis: Anxiety, Depression      Reviewed:   Care plan, including goal: To decrease anxiety  and depression      Individualized needs: Utilize coping skills to manage interpersonal relationships.      On 6/13/24 patient reports on a scale from 1-10 (1 being no concerns through 10 being severe concerns) the patient listed her:       Depression: 10   Anger: 1   Anxiety:  1      Progress:  Recently returned to [facility] after being inpatient at [facility] for suicide attempt. Patient is still reporting depression at a 10. Patient psychiatrist at the [facility] just added Ambilify to her medication list per patient. Minimal engagement.     Next clinician appt: 7/3/24      Outside providers:  TBD      Discharge plan:  TBD      Attendance: 7 out of 10 (4 were due to hospitalization at [facility]      Started: 5/29/24      Anticipated Discharge Date: TBD

## 2024-06-17 ENCOUNTER — HOSPITAL ENCOUNTER (OUTPATIENT)
Dept: PSYCHIATRY | Age: 69
Setting detail: THERAPIES SERIES
Discharge: HOME | End: 2024-06-17
Payer: MEDICARE

## 2024-06-17 PROCEDURE — 90853 GROUP PSYCHOTHERAPY: CPT

## 2024-06-17 NOTE — PROGRESS NOTES
Intensive Outpatient Program Behavioral Health Psychotherapy Note        Diagnosis: Anxiety, Depression       Psychotherapy Session    Start time: 1200   Stop time: 1500    Problem number: 01   Short term goal (STP):   to alleviate depression   Patient Mental Status and Mood/Affect: Depressed, anxious, shaking, blunt, flat      Patient Behavior and Appearance: Neatly Groomed and Attentive    Intervention/Techniques: Informed, Validated/Supported, Refocused, Assigned, Reflected, Guided, Challenged, Reframed, Modeled/Rehearsed, Prompted/Cued, Listened/Empathized, Observed/Monitored, Queried/Probed, and Promoted Peer Support      On a scale from 1-10 (1 being no concerns through 10 being severe concerns) the patient listed her:      Depression:  10   Anger:1   Anxiety: 1      Patient reports her biggest stressor is herself. Patient set a goal/ activity to accomplish for today to eat. Patient set a self-care activity of swimming today. Patient reports she has not started her new medication as of yet for Depression.       Focus of Session/Patient Response and Progress Towards Goal: Patient is a 69 year old female that attended her scheduled IOP Group. Continuing with the S.E.L.[facility] curriculum, patients learned  to define defense mechanisms, understand their purpose. LSW noted that patient was able to identify his/ her own typical automatic defense mechanisms. LSW noted that patient presented as engaged and supportive of his/ her fellow group members and his/ her engagement and support was well-received by others.  Patient denies any thoughts of harm to self and denies any acute safety concerns remains appropriate for outpatient level of care. Patient asked appropriate questions. Patient was engaged in the session. Patient participated fully in the session. Patient received and processed the feedback/ advice from group members in the session.

## 2024-06-19 ENCOUNTER — HOSPITAL ENCOUNTER (OUTPATIENT)
Dept: PSYCHIATRY | Age: 69
Setting detail: THERAPIES SERIES
Discharge: HOME | End: 2024-06-19
Payer: MEDICARE

## 2024-06-19 PROCEDURE — 90853 GROUP PSYCHOTHERAPY: CPT

## 2024-06-19 NOTE — PROGRESS NOTES
Intensive Outpatient Program Behavioral Health Psychotherapy Note        Diagnosis: Anxiety, Depression       Psychotherapy Session    Start time: 1200   Stop time: 1500    Problem number: 01   Short term goal (STP):   to get better   Patient Mental Status and Mood/Affect: Depressed, anxious, shaking, blunt, flat      Patient Behavior and Appearance: Neatly Groomed and Attentive    Intervention/Techniques: Informed, Validated/Supported, Refocused, Assigned, Reflected, Guided, Challenged, Reframed, Modeled/Rehearsed, Prompted/Cued, Listened/Empathized, Observed/Monitored, Queried/Probed, and Promoted Peer Support      On a scale from 1-10 (1 being no concerns through 10 being severe concerns) the patient listed her:      Depression:  10   Anger:1   Anxiety: 1      Patient reports her biggest stressor is herself. Patient reports she still has not started her new depression medication.   Patient set a goal/ activity to accomplish for today: Grocery Shopping   Patient set a self-care activity for today: Showering       Focus of Session/Patient Response and Progress Towards Goal: Patient is a 69 year old female that attended her scheduled IOP Group. Continuing with the S.E.L.[facility] curriculum, patients learned to self-limiting roles they play due to past experiences, understand the negative impact of reenacting these roles, and develop strategies to break free from them. LSW noted that the patient reflected on past experiences that may have influenced the roles they play in their life. LSW noted that patient presented as engaged and supportive of his/ her fellow group members and his/ her engagement and support was well-received by others.  Patient denies any thoughts of harm to self and denies any acute safety concerns remains appropriate for outpatient level of care. Patient asked appropriate questions. Patient was engaged in the session. Patient participated fully in the session. Patient received and processed the

## 2024-06-20 ENCOUNTER — HOSPITAL ENCOUNTER (OUTPATIENT)
Dept: PSYCHIATRY | Age: 69
Setting detail: THERAPIES SERIES
Discharge: HOME | End: 2024-06-20
Payer: MEDICARE

## 2024-06-20 PROCEDURE — 90853 GROUP PSYCHOTHERAPY: CPT

## 2024-06-20 NOTE — PROGRESS NOTES
Intensive Outpatient Program Behavioral Health Psychotherapy Note        Diagnosis: Anxiety, Depression       Psychotherapy Session    Start time: 1200   Stop time: 1500    Problem number: 01   Short term goal (STP):   to get better   Patient Mental Status and Mood/Affect: Depressed, superficial, minimizing      Patient Behavior and Appearance: Neatly Groomed and Attentive    Intervention/Techniques: Informed, Validated/Supported, Refocused, Assigned, Reflected, Guided, Challenged, Reframed, Modeled/Rehearsed, Prompted/Cued, Listened/Empathized, Observed/Monitored, Queried/Probed, and Promoted Peer Support      On a scale from 1-10 (1 being no concerns through 10 being severe concerns) the patient listed her:      Depression:  10   Anger:1   Anxiety: 1      Patient reports her biggest stressor is herself. Patient reports she still has not started her new depression medication.   Patient set a goal/ activity to accomplish for today:   Shopping for gift for [employer]   Patient set a self-care activity for today: Showering       Focus of Session/Patient Response and Progress Towards Goal: Patient is a 69 year old female that attended her scheduled IOP Group. Continuing with the S.E.L.[facility] curriculum, patients continued to learn the self-limiting roles they play due to past experiences, understand the negative impact of reenacting these roles, and develop strategies to break free from them. Patient learned to recognize “the people pleaser” as always prioritizing others needs and opinions over their own. Patients learned strategies for breaking free from their self-limiting roles. LSW noted that the patient reflected on what he/ she could do to change their role to a “Creator-Challenger” and/ or “”. LSW noted that patient presented as engaged and supportive of his/ her fellow group members and his/ her engagement and support was well-received by others.  Patient denies any thoughts of harm to self and denies any acute

## 2024-06-24 ENCOUNTER — HOSPITAL ENCOUNTER (OUTPATIENT)
Dept: PSYCHIATRY | Age: 69
Setting detail: THERAPIES SERIES
Discharge: HOME | End: 2024-06-24
Payer: MEDICARE

## 2024-06-24 PROCEDURE — 90853 GROUP PSYCHOTHERAPY: CPT

## 2024-06-24 NOTE — PROGRESS NOTES
Intensive Outpatient Program Behavioral Health Psychotherapy Note        Diagnosis: Anxiety, Depression       Psychotherapy Session    Start time: 1200   Stop time: 1500    Problem number: 01   Short term goal (STP):   to get better   Patient Mental Status and Mood/Affect: Depressed, superficial, minimizing      Patient Behavior and Appearance: Neatly Groomed and Attentive    Intervention/Techniques: Informed, Validated/Supported, Refocused, Assigned, Reflected, Guided, Challenged, Reframed, Modeled/Rehearsed, Prompted/Cued, Listened/Empathized, Observed/Monitored, Queried/Probed, and Promoted Peer Support      On a scale from 1-10 (1 being no concerns through 10 being severe concerns) the patient listed her:      Depression:  10   Anger:1   Anxiety: 1      Patient reports her biggest stressor is herself. Patient reports her “meds need to work” Patient advised to reach out to her prescribing physician at the [facility].       Focus of Session/Patient Response and Progress Towards Goal: Patient is a 69 year old female that attended her scheduled IOP Group. Continuing with the S.E.L.F curriculum, patients learned to differentiate between healthy attachment and trauma bonding and gained tools to assess the nature and quality of their interpersonal relationships. LSW noted that patients could recognize some signs of trauma bonding that they have experienced in their lives. LSW noted that patient presented as engaged and supportive of his/ her fellow group members and his/ her engagement and support was well-received by others.  Patient denies any thoughts of harm to self and denies any acute safety concerns remains appropriate for outpatient level of care. Patient asked appropriate questions. Patient was engaged in the session. Patient participated fully in the session. Patient received and processed the feedback/ advice from group members in the session.

## 2024-06-26 ENCOUNTER — HOSPITAL ENCOUNTER (OUTPATIENT)
Dept: PSYCHIATRY | Age: 69
Setting detail: THERAPIES SERIES
Discharge: HOME | End: 2024-06-26
Payer: MEDICARE

## 2024-06-26 PROCEDURE — 90853 GROUP PSYCHOTHERAPY: CPT

## 2024-06-26 NOTE — PROGRESS NOTES
Intensive Outpatient Program Behavioral Health Psychotherapy Note        Diagnosis: Anxiety, Depression       Psychotherapy Session    Start time: 1200   Stop time: 1500    Problem number: 01   Short term goal (STP):   to get well   Patient Mental Status and Mood/Affect: Depressed, superficial, minimizing      Patient Behavior and Appearance: Neatly Groomed and Attentive    Intervention/Techniques: Informed, Validated/Supported, Refocused, Assigned, Reflected, Guided, Challenged, Reframed, Modeled/Rehearsed, Prompted/Cued, Listened/Empathized, Observed/Monitored, Queried/Probed, and Promoted Peer Support      On a scale from 1-10 (1 being no concerns through 10 being severe concerns) the patient listed her:      Depression:  10   Anger:1   Anxiety: 1      Patient reports her biggest stressor is herself. Patient was again advised to reach out to her prescribing physician at the [facility] as her depression level is still being self-reported at 10. Patient shared that she is still addressing her ADLs and doing work around the house. Patient reports that she is sleeping well at night.       Focus of Session/Patient Response and Progress Towards Goal: Patient is a 69 year old female that attended her scheduled IOP Group. Continuing with the S.E.L.F curriculum, patients learned to differentiate between healthy attachment and trauma bonding and gained tools to assess the nature and quality of their interpersonal relationships. LSW noted that the patient was able to reflect on his/ her own relationships and identify any patterns or signs of trauma bonding, such as power imbalances, fear, or a lack of emotional safety. LSW noted that patient presented as engaged and supportive of his/ her fellow group members and his/ her engagement and support was well-received by others.  Patient denies any thoughts of harm to self and denies any acute safety concerns remains appropriate for outpatient level of care. Patient asked

## 2024-06-27 ENCOUNTER — HOSPITAL ENCOUNTER (OUTPATIENT)
Dept: PSYCHIATRY | Age: 69
Setting detail: THERAPIES SERIES
Discharge: HOME | End: 2024-06-27
Payer: MEDICARE

## 2024-06-27 PROCEDURE — 90853 GROUP PSYCHOTHERAPY: CPT

## 2024-06-27 NOTE — PROGRESS NOTES
Intensive Outpatient Program Behavioral Health Psychotherapy Note        Diagnosis: Anxiety, Depression       Psychotherapy Session    Start time: 1200   Stop time: 1500    Problem number: 01   Short term goal (STP):   to get well   Patient Mental Status and Mood/Affect: Depressed, superficial, minimizing      Patient Behavior and Appearance: Neatly Groomed and Attentive    Intervention/Techniques: Informed, Validated/Supported, Refocused, Assigned, Reflected, Guided, Challenged, Reframed, Modeled/Rehearsed, Prompted/Cued, Listened/Empathized, Observed/Monitored, Queried/Probed, and Promoted Peer Support      On a scale from 1-10 (1 being no concerns through 10 being severe concerns) the patient listed her:      Depression:  10   Anger:1   Anxiety: 1      Patient reports her biggest stressor is herself. Patient was again advised to reach out to her prescribing physician at the [facility] as her depression level is still being self-reported at 10.  Patient shared that she is overwhelmed with needing to deep clean her home. Group members were supportive of patient helping her break down the cleaning tasks to more attainable goals.       Focus of Session/Patient Response and Progress Towards Goal: Patient is a 69 year old female that attended her scheduled IOP Group. Continuing with the S.E.L.F curriculum, patients learned that letting go refers to the process of releasing or freeing oneself from negative emotions, attachments or perceptions related to people or events, accepting change, and moving forward. It involves acknowledging and accepting the past or present situation, resolving to move on, and taking action to allow oneself to heal and grow. LSW noted that patient was able to differentiate between letting go and giving up and was able to share an experience where he/she had to let go of someone or something in their lives. Patient denies any thoughts of harm to self and denies any acute safety concerns

## 2024-06-28 NOTE — PLAN OF CARE
Treatment Team Meeting in attendance:  Christine Pation; Tameka Thornton      Diagnosis: Anxiety, Depression      Reviewed:   Care plan, including goal: To decrease anxiety  and depression      Individualized needs: Utilize coping skills to manage interpersonal relationships.      On 6/27/24 patient reports on a scale from 1-10 (1 being no concerns through 10 being severe concerns) the patient listed her:       Depression: 10   Anger: 1   Anxiety:  1      Progress:  Recently returned to [facility] after being inpatient at [facility] for suicide attempt. Patient is still reporting depression at a 10. Patient psychiatrist at the [facility] just added Ambilify to her medication list per patient. Minimal engagement. Patient reports she is attending to her ADLs and household chores; however, she is not “herself”. Patient minimizes need for IOP and services and superficial during IOP group lessons.      Next clinician appt: 7/3/24      Outside providers:  TBD      Discharge plan:  TBD      Attendance: 9 out of 10       Started: 5/29/24      Anticipated Discharge Date: TBD

## 2024-07-01 ENCOUNTER — HOSPITAL ENCOUNTER (OUTPATIENT)
Dept: PSYCHIATRY | Age: 69
Setting detail: THERAPIES SERIES
Discharge: HOME | End: 2024-07-01
Payer: MEDICARE

## 2024-07-01 PROCEDURE — 90853 GROUP PSYCHOTHERAPY: CPT

## 2024-07-01 NOTE — PROGRESS NOTES
Intensive Outpatient Program Behavioral Health Psychotherapy Note        Diagnosis: Anxiety, Depression       Psychotherapy Session    Start time: 1200   Stop time: 1500    Problem number:    Short term goal (STP):   to get better   Patient Mental Status and Mood/Affect: Depressed, superficial, minimizing      Patient Behavior and Appearance: Neatly Groomed and Attentive    Intervention/Techniques: Informed, Validated/Supported, Refocused, Assigned, Reflected, Guided, Challenged, Reframed, Modeled/Rehearsed, Prompted/Cued, Listened/Empathized, Observed/Monitored, Queried/Probed, and Promoted Peer Support      On a scale from 1-10 (1 being no concerns through 10 being severe concerns) the patient listed her:      Depression:  10   Anger:1   Anxiety: 1      Patient reports her biggest stressor is herself. Patient was again advised to reach out to her prescribing physician at the [facility] as her depression level is still being self-reported at 10.  LSW met with patient after group to discuss her depression. Patient reports she is only depressed when she is at her home. Patient is recently remarried and living in the new home with has constant reminders and photos of  her new ’s  wife throughout the home that makes her feel uncomfortable. LSW discussed with patient about having an open and honest conversation with her new spouse about how this is affecting her.       Focus of Session/Patient Response and Progress Towards Goal: Patient is a 69 year old female that attended her scheduled IOP Group. Continuing with the S.E.L.F curriculum, patients learned the importance of setting boundaries to define oneself, resisting peer pressure, and preserving one's sense of identity in relationships. Patients explored strategies to establish healthy boundaries, develop critical thinking skills, and understand the potential risks of losing oneself in relationships. LSW noted that patient was able to share an

## 2024-07-03 ENCOUNTER — HOSPITAL ENCOUNTER (OUTPATIENT)
Dept: PSYCHIATRY | Age: 69
Setting detail: THERAPIES SERIES
Discharge: HOME | End: 2024-07-03
Payer: MEDICARE

## 2024-07-03 PROCEDURE — 90853 GROUP PSYCHOTHERAPY: CPT

## 2024-07-03 NOTE — PROGRESS NOTES
Intensive Outpatient Program Behavioral Health Psychotherapy Note        Diagnosis: Anxiety, Depression       Psychotherapy Session    Start time: 1200   Stop time: 1500    Problem number: 01   Short term goal (STP):   to get better   Patient Mental Status and Mood/Affect: Depressed, superficial, minimizing      Patient Behavior and Appearance: Neatly Groomed and Attentive    Intervention/Techniques: Informed, Validated/Supported, Refocused, Assigned, Reflected, Guided, Challenged, Reframed, Modeled/Rehearsed, Prompted/Cued, Listened/Empathized, Observed/Monitored, Queried/Probed, and Promoted Peer Support      On a scale from 1-10 (1 being no concerns through 10 being severe concerns) the patient listed her:      Depression:  10   Anger:1   Anxiety: 1      Patient reports her biggest stressor is herself. Patient was again advised to reach out to her prescribing physician at the [facility] as her depression level is still being self-reported at 10.       Patient set a goal/ activity to accomplish for today: Shower   Patient set a self-care activity for today:  pies          Focus of Session/Patient Response and Progress Towards Goal: Patient is a 69 year old female that attended her scheduled IOP Group. Continuing with the S.E.L.F curriculum, individuals learned to understand the nature of habits, explore the challenges in breaking them, manage difficult emotions, recognize fears and losses associated with habit change, and develop effective methods for changing habits. LSW noted that the patient was able to share an experience of trying to change a habit and the emotions he or she encountered. Patient denies any thoughts of harm to self and denies any acute safety concerns remains appropriate for outpatient level of care. Patient asked appropriate questions. Patient was engaged in the session. Patient participated fully in the session. Patient received and processed the feedback/ advice from group members

## 2024-07-08 ENCOUNTER — HOSPITAL ENCOUNTER (OUTPATIENT)
Dept: PSYCHIATRY | Age: 69
Setting detail: THERAPIES SERIES
Discharge: HOME | End: 2024-07-08
Payer: MEDICARE

## 2024-07-08 PROCEDURE — 90853 GROUP PSYCHOTHERAPY: CPT

## 2024-07-08 NOTE — PROGRESS NOTES
Intensive Outpatient Program Behavioral Health Psychotherapy Note        Diagnosis: Anxiety, Depression       Psychotherapy Session    Start time: 1200   Stop time: 1500    Problem number: 01   Short term goal (STP):   to get better   Patient Mental Status and Mood/Affect: Depressed, superficial, minimizing      Patient Behavior and Appearance: Neatly Groomed and Attentive    Intervention/Techniques: Informed, Validated/Supported, Refocused, Assigned, Reflected, Guided, Challenged, Reframed, Modeled/Rehearsed, Prompted/Cued, Listened/Empathized, Observed/Monitored, Queried/Probed, and Promoted Peer Support      On a scale from 1-10 (1 being no concerns through 10 being severe concerns) the patient listed her:      Depression:  10   Anger:1   Anxiety: 1      Patient reports her biggest stressor is herself. Patient was again advised to reach out to her prescribing physician at the [facility] as her depression level is still being self-reported at 10. Patient reports she has an appointment with her prescribing physician this week.       Patient set a goal/ activity to accomplish for today: swimming   Patient set a self-care activity for today: eating        Focus of Session/Patient Response and Progress Towards Goal: Patient is a 69 year old female that attended her scheduled IOP Group. Continuing with the S.E.L.F curriculum, individuals learned that change is an essential part of personal and organizational growth because it allows us to adapt, innovate, and improve. By discussing the concept of change, patient’s explored its various dimensions and understand its significance in different contexts. LSW noted that patient was able to identify his/ her fears of change that may keep them from making a change. Patient denies any thoughts of harm to self and denies any acute safety concerns remains appropriate for outpatient level of care. Patient asked appropriate questions. Patient was engaged in the session. Patient

## 2024-07-10 ENCOUNTER — HOSPITAL ENCOUNTER (OUTPATIENT)
Dept: PSYCHIATRY | Age: 69
Setting detail: THERAPIES SERIES
Discharge: HOME | End: 2024-07-10
Payer: MEDICARE

## 2024-07-10 PROCEDURE — 90853 GROUP PSYCHOTHERAPY: CPT

## 2024-07-10 NOTE — PROGRESS NOTES
Intensive Outpatient Program Behavioral Health Psychotherapy Note        Diagnosis: Anxiety, Depression       Psychotherapy Session    Start time: 1200   Stop time: 1500    Problem number: 01   Short term goal (STP):   to get better   Patient Mental Status and Mood/Affect: Depressed, superficial, minimizing      Patient Behavior and Appearance: Neatly Groomed and Attentive    Intervention/Techniques: Informed, Validated/Supported, Refocused, Assigned, Reflected, Guided, Challenged, Reframed, Modeled/Rehearsed, Prompted/Cued, Listened/Empathized, Observed/Monitored, Queried/Probed, and Promoted Peer Support      On a scale from 1-10 (1 being no concerns through 10 being severe concerns) the patient listed her:      Depression:  10   Anger:1   Anxiety: 1      Patient reports her biggest stressor is herself. Patient advised that she reached out to her prescribing physician at the [facility] and her medication was increased. Patient reports completing daily activities of living, going out to eat and interacting with family.       Patient set a goal/ activity to accomplish for today: shower   Patient set a self-care activity for today: go out to eat          Focus of Session/Patient Response and Progress Towards Goal: Patient is a 69 year old female that attended her scheduled IOP Group. Continuing with the S.E.L.F curriculum, patients explored the crucial role of direction, vision, and future planning in driving change. We discussed how each of us can shape our future through the choices we make in the present. LSW noted the patient provided an example where lack of direction hindered their progress and identified the potential barriers or challenges that can hinder him/her from aligning his/her choices with their desired future. Patient denies any thoughts of harm to self and denies any acute safety concerns remains appropriate for outpatient level of care. Patient asked appropriate questions. Patient was engaged in

## 2024-07-11 ENCOUNTER — HOSPITAL ENCOUNTER (OUTPATIENT)
Dept: PSYCHIATRY | Age: 69
Setting detail: THERAPIES SERIES
Discharge: HOME | End: 2024-07-11
Payer: MEDICARE

## 2024-07-11 PROCEDURE — 90853 GROUP PSYCHOTHERAPY: CPT

## 2024-07-11 NOTE — PROGRESS NOTES
Intensive Outpatient Program Behavioral Health Psychotherapy Note        Diagnosis: Anxiety, Depression       Psychotherapy Session    Start time: 1200   Stop time: 1500    Problem number: 01   Short term goal (STP):   to get better   Patient Mental Status and Mood/Affect: Depressed, superficial, minimizing      Patient Behavior and Appearance: Neatly Groomed and Attentive    Intervention/Techniques: Informed, Validated/Supported, Refocused, Assigned, Reflected, Guided, Challenged, Reframed, Modeled/Rehearsed, Prompted/Cued, Listened/Empathized, Observed/Monitored, Queried/Probed, and Promoted Peer Support      On a scale from 1-10 (1 being no concerns through 10 being severe concerns) the patient listed her:      Depression:  10   Anger:1   Anxiety: 1      Patient reports her biggest stressor is herself. Patient advised that she reached out to her prescribing physician at the [facility] and her medication was increased. Patient reports completing daily activities of living, going out to eat and interacting with family.       Patient set a goal/ activity to accomplish for today: shower   Patient set a self-care activity for today: go out to eat          Focus of Session/Patient Response and Progress Towards Goal: Patient is a 69 year old female that attended her scheduled IOP Group. Continuing with the S.E.L.F curriculum, patients explored learning from past mistakes is a fundamental aspect of personal and professional development. Patients learned that if we take the time to reflect on our actions and analyze what went wrong, they can gain valuable insights that can guide them toward making better choices in the future. LSW noted the patient was reflective on why people sometimes repeat their mistakes and was able to identify what he/ she can learn from doing the same thing over and over and expecting different results. Patient denies any thoughts of harm to self and denies any acute safety concerns remains

## 2024-07-11 NOTE — CARE COORDINATION
Treatment Team Meeting in attendance:  Christine Thornton      Diagnosis: Anxiety, Depression      Reviewed:   Care plan, including goal: To decrease anxiety  and depression      Individualized needs: Utilize coping skills to manage interpersonal relationships.      On 7/11/24 patient reports on a scale from 1-10 (1 being no concerns through 10 being severe concerns) the patient listed her:       Depression: 10   Anger: 1   Anxiety:  1      Progress:  Patient is continuing to report her depression at a 10.  Minimal therapeutic engagement by patient in IOP. Patient reports she is attending to her ADLs and household chores; however, she is not “herself”. Patient minimizes need for IOP and services and superficial during IOP group lessons.      Next clinician appt: 7/24/24      Outside providers:  TBD      Discharge plan:  TBD      Attendance:10 out of 10       Started: 5/29/24      Anticipated Discharge Date: [facility]NIA

## 2024-07-15 ENCOUNTER — HOSPITAL ENCOUNTER (OUTPATIENT)
Dept: PSYCHIATRY | Age: 69
Setting detail: THERAPIES SERIES
Discharge: HOME | End: 2024-07-15
Payer: MEDICARE

## 2024-07-15 PROCEDURE — 90853 GROUP PSYCHOTHERAPY: CPT

## 2024-07-15 NOTE — PROGRESS NOTES
Intensive Outpatient Program Behavioral Health Psychotherapy Note        Diagnosis: Anxiety, Depression       Psychotherapy Session    Start time: 1200   Stop time: 1500    Problem number: 01   Short term goal (STP):   to get better   Patient Mental Status and Mood/Affect: Depressed, superficial, minimizing      Patient Behavior and Appearance: Neatly Groomed and Attentive    Intervention/Techniques: Informed, Validated/Supported, Refocused, Assigned, Reflected, Guided, Challenged, Reframed, Modeled/Rehearsed, Prompted/Cued, Listened/Empathized, Observed/Monitored, Queried/Probed, and Promoted Peer Support      On a scale from 1-10 (1 being no concerns through 10 being severe concerns) the patient listed her:      Depression:  10   Anger:1   Anxiety: 1      Patient reports her biggest stressor is herself.  Patient reports completing daily activities of living, going out to eat and interacting with family.       Patient set a goal/ activity to accomplish for today: shower   Patient set a self-care activity for today: swimming          Focus of Session/Patient Response and Progress Towards Goal: Patient is a 69 year old female that attended her scheduled IOP Group. Continuing with the [facility] curriculum, patients explored different communication styles (nonverbal, passive, aggressive and assertiveness).LSW noted the patient was reflective on assessing their own assertiveness. Patient denies any thoughts of harm to self and denies any acute safety concerns remains appropriate for outpatient level of care. Patient asked appropriate questions. Patient was engaged in the session. Patient participated fully in the session. Patient received and processed the feedback/ advice from group members in the session.

## 2024-07-17 ENCOUNTER — HOSPITAL ENCOUNTER (OUTPATIENT)
Dept: PSYCHIATRY | Age: 69
Setting detail: THERAPIES SERIES
Discharge: HOME | End: 2024-07-17
Payer: MEDICARE

## 2024-07-17 PROCEDURE — 90853 GROUP PSYCHOTHERAPY: CPT

## 2024-07-18 ENCOUNTER — HOSPITAL ENCOUNTER (OUTPATIENT)
Dept: PSYCHIATRY | Age: 69
Setting detail: THERAPIES SERIES
Discharge: HOME | End: 2024-07-18
Payer: MEDICARE

## 2024-07-18 PROCEDURE — 90853 GROUP PSYCHOTHERAPY: CPT

## 2024-07-18 NOTE — CARE COORDINATION
Intensive Outpatient Program Behavioral Health Psychotherapy Note        Diagnosis: Anxiety, Depression       Psychotherapy Session    Start time: 1200   Stop time: 1500    Problem number: 01   Short term goal (STP):   to get better   Patient Mental Status and Mood/Affect: Depressed, superficial, minimizing      Patient Behavior and Appearance: Neatly Groomed and Attentive    Intervention/Techniques: Informed, Validated/Supported, Refocused, Assigned, Reflected, Guided, Challenged, Reframed, Modeled/Rehearsed, Prompted/Cued, Listened/Empathized, Observed/Monitored, Queried/Probed, and Promoted Peer Support      On a scale from 1-10 (1 being no concerns through 10 being severe concerns) the patient listed her:      Depression:  10   Anger:1   Anxiety: 1      The patient disclosed that her biggest stressor is herself.    Patient set a goal/ activity to accomplish for today: shower   Patient set a self-care activity for today: swimming       Focus of Session/Patient Response and Progress Towards Goal: Patient is a 69 year old female that attended her scheduled IOP Group. Continuing with the [facility] curriculum, patients explored I statements and how they are a powerful communication tool that can help express feelings, thoughts, and needs in a clear and assertive way. Additionally, patients learned about nonverbal language, and the significant role it has in communication, often conveying emotions and attitudes that words alone may not express. LSW noted that patient was able to practice utilizing “I” statements with fellow group members during discussion. Patient denies any thoughts of harm to self and denies any acute safety concerns remains appropriate for outpatient level of care. Patient asked appropriate questions. Patient was engaged in the session. Patient participated fully in the session. Patient received and processed the feedback/ advice from group members in the session.

## 2024-07-18 NOTE — PROGRESS NOTES
Intensive Outpatient Program Behavioral Health Psychotherapy Note        Diagnosis: Anxiety, Depression       Psychotherapy Session    Start time: 1200   Stop time: 1500    Problem number: 01   Short term goal (STP):   to get better   Patient Mental Status and Mood/Affect: Depressed, superficial, minimizing      Patient Behavior and Appearance: Neatly Groomed and Attentive    Intervention/Techniques: Informed, Validated/Supported, Refocused, Assigned, Reflected, Guided, Challenged, Reframed, Modeled/Rehearsed, Prompted/Cued, Listened/Empathized, Observed/Monitored, Queried/Probed, and Promoted Peer Support      On a scale from 1-10 (1 being no concerns through 10 being severe concerns) the patient listed her:      Depression:  10   Anger:1   Anxiety: 1      Patient reports her biggest stressor is herself.  Patient reports completing daily activities of living, going out to eat and interacting with family.  Patient enjoys going out to eat, swimming daily and reports she will be going to the fair this weekend with her spouse.     Patient set a goal/ activity to accomplish for today: shower   Patient set a self-care activity for today: swimming          Focus of Session/Patient Response and Progress Towards Goal: Patient is a 69 year old female that attended her scheduled IOP Group. Continuing with the [facility] curriculum, patients learned emotional control, how to replace negative thoughts with positive thoughts, and we discussed effective listing communication. LSW noted that the patient was able to practice replacing negative thoughts with positive thoughts with fellow group members during discussion. Patient denies any thoughts of harm to self and denies any acute safety concerns remains appropriate for outpatient level of care. Patient asked appropriate questions. Patient was engaged in the session. Patient participated fully in the session. Patient received and processed the feedback/ advice from group members in  no tenderness bilaterally

## 2024-07-22 ENCOUNTER — HOSPITAL ENCOUNTER (OUTPATIENT)
Dept: PSYCHIATRY | Age: 69
Setting detail: THERAPIES SERIES
Discharge: HOME | End: 2024-07-22
Payer: MEDICARE

## 2024-07-22 PROCEDURE — 90853 GROUP PSYCHOTHERAPY: CPT

## 2024-07-22 NOTE — PROGRESS NOTES
Intensive Outpatient Program Behavioral Health Psychotherapy Note        Diagnosis: Anxiety, Depression       Psychotherapy Session    Start time: 1200   Stop time: 1500    Problem number: 01   Short term goal (STP):   to get better   Patient Mental Status and Mood/Affect: Depressed, superficial, minimizing      Patient Behavior and Appearance: Neatly Groomed and Attentive    Intervention/Techniques: Informed, Validated/Supported, Refocused, Assigned, Reflected, Guided, Challenged, Reframed, Modeled/Rehearsed, Prompted/Cued, Listened/Empathized, Observed/Monitored, Queried/Probed, and Promoted Peer Support      On a scale from 1-10 (1 being no concerns through 10 being severe concerns) the patient listed her:      Depression:  10   Anger:1   Anxiety: 1      Patient reports her biggest stressor is herself.     Patient set a goal/ activity to accomplish for today: shower   Patient set a self-care activity for today: shower          Focus of Session/Patient Response and Progress Towards Goal: Patient is a 69 year old female that attended her scheduled IOP Group. Continuing with the [facility] curriculum, patients learned that personal power and empowerment are essential for leading a fulfilling and successful life. In this lesson, patients explored strategies and techniques to help them harness their own personal power and cultivate a sense of empowerment. LSW noted that the patient was able to identify his/ her strengths and weaknesses. Patient denies any thoughts of harm to self and denies any acute safety concerns remains appropriate for outpatient level of care. Patient asked appropriate questions. Patient was engaged in the session. Patient participated fully in the session. Patient received and processed the feedback/ advice from group members in the session.

## 2024-07-24 ENCOUNTER — HOSPITAL ENCOUNTER (OUTPATIENT)
Dept: PSYCHIATRY | Age: 69
Setting detail: THERAPIES SERIES
Discharge: HOME | End: 2024-07-24
Payer: MEDICARE

## 2024-07-24 DIAGNOSIS — F33.2 SEVERE EPISODE OF RECURRENT MAJOR DEPRESSIVE DISORDER, WITHOUT PSYCHOTIC FEATURES (HCC): Primary | ICD-10-CM

## 2024-07-24 DIAGNOSIS — F41.1 GAD (GENERALIZED ANXIETY DISORDER): ICD-10-CM

## 2024-07-24 PROCEDURE — 90853 GROUP PSYCHOTHERAPY: CPT

## 2024-07-24 RX ORDER — QUETIAPINE FUMARATE 25 MG/1
TABLET, FILM COATED ORAL
Qty: 90 TABLET | Refills: 2 | Status: SHIPPED | OUTPATIENT
Start: 2024-07-24

## 2024-07-24 RX ORDER — FLUOXETINE HYDROCHLORIDE 20 MG/1
60 CAPSULE ORAL DAILY
Qty: 90 CAPSULE | Refills: 2 | Status: SHIPPED | OUTPATIENT
Start: 2024-07-24

## 2024-07-24 RX ORDER — HYDROXYZINE PAMOATE 25 MG/1
25 CAPSULE ORAL 3 TIMES DAILY PRN
Qty: 90 CAPSULE | Refills: 2 | Status: SHIPPED | OUTPATIENT
Start: 2024-07-24 | End: 2024-10-22

## 2024-07-24 NOTE — PROGRESS NOTES
Intensive Outpatient Program Behavioral Health Psychotherapy Note        Diagnosis: Anxiety, Depression       Psychotherapy Session    Start time: 1200   Stop time: 1500    Problem number: 01   Short term goal (STP):   to get better   Patient Mental Status and Mood/Affect: Depressed, superficial, minimizing      Patient Behavior and Appearance: Neatly Groomed and Attentive    Intervention/Techniques: Informed, Validated/Supported, Refocused, Assigned, Reflected, Guided, Challenged, Reframed, Modeled/Rehearsed, Prompted/Cued, Listened/Empathized, Observed/Monitored, Queried/Probed, and Promoted Peer Support      On a scale from 1-10 (1 being no concerns through 10 being severe concerns) the patient listed her:      Depression:  10   Anger:1   Anxiety: 1      Patient reports her biggest stressor is herself.         Focus of Session/Patient Response and Progress Towards Goal: Patient is a 69 year old female that attended her scheduled IOP Group. Patient’s worked on bonding and forming relationships with one another. Patient denies any thoughts of harm to self and denies any acute safety concerns remains appropriate for outpatient level of care. Patient asked appropriate questions. Patient was engaged in the session. Patient participated fully in the session. Patient received and processed the feedback/ advice from group members in the session.

## 2024-07-24 NOTE — PROGRESS NOTES
regarding medications.     Medical Diagnoses:  Patient Active Problem List   Diagnosis    Hypertension    Depression    Lower back pain    GERD (gastroesophageal reflux disease)    OCD (obsessive compulsive disorder)    Carcinoid (except of appendix)    Severe episode of recurrent major depressive disorder, without psychotic features (HCC)    ÁLVARO (generalized anxiety disorder)     Psychiatric Diagnoses:  1. Severe episode of recurrent major depressive disorder, without psychotic features (HCC)    2. ÁLVARO (generalized anxiety disorder)              Plan:    Start/Continue     Continue Prozac 60mg QAM  Continue Vistrail 25mg TID PRN- first line for anxiety  Continue Seroquel 12.5mg Qam and afternoon; 50mg QHS  Continue Xanax 0.25mg BID PRN- second line for anxiety- will let provider know when rx is needed    No Labs ordered today   Crisis plan reviewed and patient verbally contracts for safety.  Go to ED with emergent symptoms or safety concerns.  Risks, benefits, side effects of medications, including any / all black box warnings, discussed with patient, who verbalizes their understanding.     Patient denies any thoughts of harm to self and denies any acute safety concerns remains appropriate for outpatient level of care. Will continue to reassess with each appointment.     Reviewed current Medications with the patient. Education provided on the compliance with treatment.   Reviewed OARRs, possibly taking in excess, running out a few months too early     The anticipated benefits and side effects of the medications, including the anticipated results of not receiving the medication, and of alternatives to the medications were explained to the patient and their informed consent was obtained for starting medications as well as adjusting the doses (titration or tapering) as indicated. The above information was given by physician in verbal form and sufficient understanding was in evidence. The patient participated in

## 2024-07-25 ENCOUNTER — HOSPITAL ENCOUNTER (OUTPATIENT)
Dept: PSYCHIATRY | Age: 69
Setting detail: THERAPIES SERIES
Discharge: HOME | End: 2024-07-25
Payer: MEDICARE

## 2024-07-25 PROCEDURE — 90853 GROUP PSYCHOTHERAPY: CPT

## 2024-07-25 NOTE — PROGRESS NOTES
Intensive Outpatient Program Behavioral Health Psychotherapy Note        Diagnosis: Anxiety, Depression       Psychotherapy Session    Start time: 1200   Stop time: 1500    Problem number: 01   Short term goal (STP):   to get better   Patient Mental Status and Mood/Affect: Depressed, superficial, minimizing      Patient Behavior and Appearance: Neatly Groomed and Attentive    Intervention/Techniques: Informed, Validated/Supported, Refocused, Assigned, Reflected, Guided, Challenged, Reframed, Modeled/Rehearsed, Prompted/Cued, Listened/Empathized, Observed/Monitored, Queried/Probed, and Promoted Peer Support      On a scale from 1-10 (1 being no concerns through 10 being severe concerns) the patient listed her:      Depression:  10   Anger:1   Anxiety: 1      Patient reports her biggest stressor is herself.         Focus of Session/Patient Response and Progress Towards Goal: Patient is a 69 year old female that attended her scheduled IOP Group. Continuing with the [facility] curriculum, patients learned that personal agency is crucial in shaping how others treat us because it empowers us to take control of our actions, choices, and behaviors. Patients learned that when we assert ourselves, set boundaries, and communicate effectively, we influence how others perceive and respond to us. LSW noted that patient was able to identify his/ her personal boundaries and what they expect from others in different contexts. Patient denies any thoughts of harm to self and denies any acute safety concerns remains appropriate for outpatient level of care. Patient asked appropriate questions. Patient was engaged in the session. Patient participated fully in the session. Patient received and processed the feedback/ advice from group members in the session.

## 2024-07-29 ENCOUNTER — HOSPITAL ENCOUNTER (OUTPATIENT)
Dept: PSYCHIATRY | Age: 69
Setting detail: THERAPIES SERIES
Discharge: HOME | End: 2024-07-29
Payer: MEDICARE

## 2024-07-29 PROCEDURE — 90853 GROUP PSYCHOTHERAPY: CPT

## 2024-07-29 NOTE — PROGRESS NOTES
Intensive Outpatient Program Behavioral Health Psychotherapy Note        Diagnosis: Anxiety, Depression       Psychotherapy Session    Start time: 1200   Stop time: 1500    Problem number: 01   Short term goal (STP):   to get better   Patient Mental Status and Mood/Affect: Depressed, superficial, minimizing      Patient Behavior and Appearance: Neatly Groomed and Attentive    Intervention/Techniques: Informed, Validated/Supported, Refocused, Assigned, Reflected, Guided, Challenged, Reframed, Modeled/Rehearsed, Prompted/Cued, Listened/Empathized, Observed/Monitored, Queried/Probed, and Promoted Peer Support      On a scale from 1-10 (1 being no concerns through 10 being severe concerns) the patient listed her:      Depression:  10   Anger:1   Anxiety: 1      Patient reports her biggest stressor is herself.         Focus of Session/Patient Response and Progress Towards Goal: Patient is a 69 year old female that attended her scheduled IOP Group. The clinician focused on building rapport and healthy group communication. The patient appeared to be engaged during the session. She participated in an interactive discussion with her peer and provided supportive reflection about forgiveness. Patient denies any thoughts of harm to self and denies any acute safety concerns remains appropriate for outpatient level of care. Patient asked appropriate questions. Patient was engaged in the session. Patient participated fully in the session. Patient received and processed the feedback/ advice from group members in the session.

## 2024-07-31 ENCOUNTER — HOSPITAL ENCOUNTER (OUTPATIENT)
Dept: PSYCHIATRY | Age: 69
Setting detail: THERAPIES SERIES
Discharge: HOME | End: 2024-07-31
Payer: MEDICARE

## 2024-07-31 PROCEDURE — 90853 GROUP PSYCHOTHERAPY: CPT

## 2024-07-31 NOTE — PROGRESS NOTES
Intensive Outpatient Program Behavioral Health Psychotherapy Note        Diagnosis: Anxiety, Depression       Psychotherapy Session    Start time: 1200   Stop time: 1500    Problem number: 01   Short term goal (STP):   to get better   Patient Mental Status and Mood/Affect: Depressed, superficial, minimizing      Patient Behavior and Appearance: Neatly Groomed and Attentive    Intervention/Techniques: Informed, Validated/Supported, Refocused, Assigned, Reflected, Guided, Challenged, Reframed, Modeled/Rehearsed, Prompted/Cued, Listened/Empathized, Observed/Monitored, Queried/Probed, and Promoted Peer Support      On a scale from 1-10 (1 being no concerns through 10 being severe concerns) the patient listed her:      Depression:  10   Anger:1   Anxiety: 1      The patient disclosed that her biggest stressor is herself.    Patient set a goal/ activity to accomplish for today: Declutter her clothes   Patient set a self-care activity for today: Relax    Focus of Session/Patient Response and Progress Towards Goal: Patient is a 69 year old female that attended her scheduled IOP Group. The clinician focused on building rapport and healthy group communication. The patient appeared to be engaged during the session. He participated in an interactive discussion with her peer and provided supportive reflection regarding exploring ways to help individuals move towards psychological well-being. Patient denies any thoughts of harm to self and denies any acute safety concerns remains appropriate for outpatient level of care. Patient asked appropriate questions. Patient was engaged in the session. Patient participated fully in the session. Patient received and processed the feedback/ advice from group members in the session.

## 2024-08-01 NOTE — PLAN OF CARE
Treatment Team Meeting in attendance:  Christine Patino; Cheryl Blake      Diagnosis: Anxiety, Depression      Reviewed:   Care plan, including goal: To decrease anxiety  and depression      Individualized needs: Utilize coping skills to manage interpersonal relationships.      On 7/31/24 patient reports on a scale from 1-10 (1 being no concerns through 10 being severe concerns) the patient listed her:       Depression: 10   Anger: 1   Anxiety:  1      Progress:  Patient is continuing to report her depression at a 10.  Minimal therapeutic engagement by patient in IOP. Patient reports she is attending to her ADLs and household chores; however, she is not “herself”. Patient minimizes need for IOP and services and superficial during IOP group lessons. Patient requests discharge from IOP 7/31/24       Outside providers:  TBD      Discharge plan:  TBD      Attendance:10 out of 10       Started: 5/29/24      Anticipated Discharge Date: 7/31/24

## 2025-08-25 ENCOUNTER — HOSPITAL ENCOUNTER (EMERGENCY)
Age: 70
Discharge: HOME | End: 2025-08-25
Payer: MEDICARE

## 2025-08-25 ENCOUNTER — APPOINTMENT (OUTPATIENT)
Dept: CT IMAGING | Age: 70
End: 2025-08-25
Payer: MEDICARE

## 2025-08-25 ENCOUNTER — APPOINTMENT (OUTPATIENT)
Dept: GENERAL RADIOLOGY | Age: 70
End: 2025-08-25
Payer: MEDICARE

## 2025-08-25 VITALS
DIASTOLIC BLOOD PRESSURE: 81 MMHG | OXYGEN SATURATION: 98 % | HEART RATE: 63 BPM | BODY MASS INDEX: 28.17 KG/M2 | WEIGHT: 165 LBS | HEIGHT: 64 IN | RESPIRATION RATE: 16 BRPM | TEMPERATURE: 97.8 F | SYSTOLIC BLOOD PRESSURE: 158 MMHG

## 2025-08-25 DIAGNOSIS — M54.41 ACUTE RIGHT-SIDED LOW BACK PAIN WITH RIGHT-SIDED SCIATICA: Primary | ICD-10-CM

## 2025-08-25 LAB
AMORPH SED URNS QL MICRO: ABNORMAL
BACTERIA URNS QL MICRO: ABNORMAL /HPF
BILIRUB UR QL STRIP: NEGATIVE
CLARITY UR: ABNORMAL
COLOR UR: YELLOW
EPI CELLS #/AREA URNS HPF: ABNORMAL /HPF
GLUCOSE UR STRIP-MCNC: NEGATIVE MG/DL
HGB UR QL STRIP: ABNORMAL
HYALINE CASTS #/AREA URNS LPF: ABNORMAL /LPF (ref 0–5)
KETONES UR STRIP-MCNC: ABNORMAL MG/DL
LEUKOCYTE ESTERASE UR QL STRIP: NEGATIVE
MUCOUS THREADS URNS QL MICRO: PRESENT /LPF
NITRITE UR QL STRIP: NEGATIVE
PH UR STRIP: 5 [PH] (ref 5–9)
PROT UR STRIP-MCNC: 30 MG/DL
RBC #/AREA URNS HPF: ABNORMAL /HPF (ref 0–2)
SP GR UR STRIP: >=1.03 (ref 1–1.03)
URINE REFLEX TO CULTURE: ABNORMAL
UROBILINOGEN UR STRIP-ACNC: 0.2 E.U./DL
WBC #/AREA URNS HPF: ABNORMAL /HPF (ref 0–5)

## 2025-08-25 PROCEDURE — 6370000000 HC RX 637 (ALT 250 FOR IP)

## 2025-08-25 PROCEDURE — 6360000002 HC RX W HCPCS

## 2025-08-25 PROCEDURE — 99284 EMERGENCY DEPT VISIT MOD MDM: CPT

## 2025-08-25 PROCEDURE — 81001 URINALYSIS AUTO W/SCOPE: CPT

## 2025-08-25 PROCEDURE — 72131 CT LUMBAR SPINE W/O DYE: CPT

## 2025-08-25 PROCEDURE — 96372 THER/PROPH/DIAG INJ SC/IM: CPT

## 2025-08-25 RX ORDER — KETOROLAC TROMETHAMINE 30 MG/ML
30 INJECTION, SOLUTION INTRAMUSCULAR; INTRAVENOUS ONCE
Status: COMPLETED | OUTPATIENT
Start: 2025-08-25 | End: 2025-08-25

## 2025-08-25 RX ORDER — OXYCODONE AND ACETAMINOPHEN 5; 325 MG/1; MG/1
1 TABLET ORAL ONCE
Refills: 0 | Status: COMPLETED | OUTPATIENT
Start: 2025-08-25 | End: 2025-08-25

## 2025-08-25 RX ORDER — LIDOCAINE 4 G/G
1 PATCH TOPICAL DAILY
Status: DISCONTINUED | OUTPATIENT
Start: 2025-08-25 | End: 2025-08-25

## 2025-08-25 RX ORDER — LIDOCAINE 50 MG/G
1 PATCH TOPICAL DAILY
Qty: 10 PATCH | Refills: 0 | Status: SHIPPED | OUTPATIENT
Start: 2025-08-25 | End: 2025-09-04

## 2025-08-25 RX ORDER — LIDOCAINE 4 G/G
1 PATCH TOPICAL ONCE
Status: DISCONTINUED | OUTPATIENT
Start: 2025-08-25 | End: 2025-08-25 | Stop reason: HOSPADM

## 2025-08-25 RX ADMIN — OXYCODONE AND ACETAMINOPHEN 1 TABLET: 325; 5 TABLET ORAL at 18:08

## 2025-08-25 RX ADMIN — OXYCODONE HYDROCHLORIDE AND ACETAMINOPHEN 1 TABLET: 5; 325 TABLET ORAL at 21:20

## 2025-08-25 RX ADMIN — KETOROLAC TROMETHAMINE 30 MG: 30 INJECTION, SOLUTION INTRAMUSCULAR at 18:08

## 2025-08-25 ASSESSMENT — PAIN DESCRIPTION - ORIENTATION: ORIENTATION: RIGHT

## 2025-08-25 ASSESSMENT — ENCOUNTER SYMPTOMS
BACK PAIN: 1
COLOR CHANGE: 0
RESPIRATORY NEGATIVE: 1

## 2025-08-25 ASSESSMENT — PAIN - FUNCTIONAL ASSESSMENT
PAIN_FUNCTIONAL_ASSESSMENT: 0-10
PAIN_FUNCTIONAL_ASSESSMENT: 0-10

## 2025-08-25 ASSESSMENT — PAIN DESCRIPTION - LOCATION
LOCATION: FLANK
LOCATION: BACK

## 2025-08-25 ASSESSMENT — PAIN DESCRIPTION - DESCRIPTORS
DESCRIPTORS: SHARP
DESCRIPTORS: SHARP

## 2025-08-25 ASSESSMENT — PAIN SCALES - GENERAL
PAINLEVEL_OUTOF10: 10
PAINLEVEL_OUTOF10: 10

## 2025-08-25 ASSESSMENT — PAIN DESCRIPTION - FREQUENCY: FREQUENCY: CONTINUOUS

## 2025-08-25 ASSESSMENT — PAIN DESCRIPTION - ONSET: ONSET: ON-GOING

## 2025-08-25 ASSESSMENT — PAIN DESCRIPTION - PAIN TYPE: TYPE: ACUTE PAIN
